# Patient Record
Sex: MALE | Race: WHITE | Employment: OTHER | URBAN - NONMETROPOLITAN AREA
[De-identification: names, ages, dates, MRNs, and addresses within clinical notes are randomized per-mention and may not be internally consistent; named-entity substitution may affect disease eponyms.]

---

## 2017-07-18 ENCOUNTER — APPOINTMENT (OUTPATIENT)
Dept: CT IMAGING | Age: 52
End: 2017-07-18
Payer: COMMERCIAL

## 2017-07-18 ENCOUNTER — HOSPITAL ENCOUNTER (EMERGENCY)
Age: 52
Discharge: HOME | End: 2017-07-18
Attending: SPECIALIST
Payer: COMMERCIAL

## 2017-07-18 VITALS
WEIGHT: 210 LBS | HEIGHT: 71 IN | SYSTOLIC BLOOD PRESSURE: 130 MMHG | RESPIRATION RATE: 14 BRPM | TEMPERATURE: 96.4 F | OXYGEN SATURATION: 98 % | BODY MASS INDEX: 29.4 KG/M2 | DIASTOLIC BLOOD PRESSURE: 70 MMHG | HEART RATE: 77 BPM

## 2017-07-18 DIAGNOSIS — R10.32 LLQ ABDOMINAL PAIN: ICD-10-CM

## 2017-07-18 DIAGNOSIS — N20.0 KIDNEY STONE: Primary | ICD-10-CM

## 2017-07-18 LAB
-: ABNORMAL
ABSOLUTE EOS #: 0.1 K/UL (ref 0–0.4)
ABSOLUTE LYMPH #: 1.4 K/UL (ref 1–4.8)
ABSOLUTE MONO #: 0.5 K/UL (ref 0.1–1.2)
ALBUMIN SERPL-MCNC: 4.3 G/DL (ref 3.5–5.2)
ALBUMIN/GLOBULIN RATIO: 1.3 (ref 1–2.5)
ALP BLD-CCNC: 110 U/L (ref 40–129)
ALT SERPL-CCNC: 61 U/L (ref 5–41)
AMORPHOUS: ABNORMAL
ANION GAP SERPL CALCULATED.3IONS-SCNC: 13 MMOL/L (ref 9–17)
AST SERPL-CCNC: 39 U/L
BACTERIA: ABNORMAL
BASOPHILS # BLD: 1 %
BASOPHILS ABSOLUTE: 0.1 K/UL (ref 0–0.2)
BILIRUB SERPL-MCNC: 0.25 MG/DL (ref 0.3–1.2)
BILIRUBIN URINE: NEGATIVE
BUN BLDV-MCNC: 22 MG/DL (ref 6–20)
BUN/CREAT BLD: 23 (ref 9–20)
CALCIUM SERPL-MCNC: 9.4 MG/DL (ref 8.6–10.4)
CASTS UA: ABNORMAL /LPF (ref 0–2)
CHLORIDE BLD-SCNC: 99 MMOL/L (ref 98–107)
CO2: 25 MMOL/L (ref 20–31)
COLOR: ABNORMAL
COMMENT UA: ABNORMAL
CREAT SERPL-MCNC: 0.94 MG/DL (ref 0.7–1.2)
CRYSTALS, UA: ABNORMAL /HPF
DIFFERENTIAL TYPE: ABNORMAL
EOSINOPHILS RELATIVE PERCENT: 1 %
EPITHELIAL CELLS UA: ABNORMAL /HPF (ref 0–5)
GFR AFRICAN AMERICAN: >60 ML/MIN
GFR NON-AFRICAN AMERICAN: >60 ML/MIN
GFR SERPL CREATININE-BSD FRML MDRD: ABNORMAL ML/MIN/{1.73_M2}
GFR SERPL CREATININE-BSD FRML MDRD: ABNORMAL ML/MIN/{1.73_M2}
GLUCOSE BLD-MCNC: 144 MG/DL (ref 70–99)
GLUCOSE URINE: NEGATIVE
HCT VFR BLD CALC: 41.8 % (ref 41–53)
HEMOGLOBIN: 13.8 G/DL (ref 13.5–17.5)
KETONES, URINE: NEGATIVE
LEUKOCYTE ESTERASE, URINE: NEGATIVE
LIPASE: 34 U/L (ref 13–60)
LYMPHOCYTES # BLD: 14 %
MCH RBC QN AUTO: 29.1 PG (ref 26–34)
MCHC RBC AUTO-ENTMCNC: 32.9 G/DL (ref 31–37)
MCV RBC AUTO: 88.3 FL (ref 80–100)
MONOCYTES # BLD: 5 %
MUCUS: ABNORMAL
NITRITE, URINE: NEGATIVE
OTHER OBSERVATIONS UA: ABNORMAL
PDW BLD-RTO: 13.5 % (ref 11–14.5)
PH UA: 5.5 (ref 5–6)
PLATELET # BLD: 226 K/UL (ref 140–450)
PLATELET ESTIMATE: ABNORMAL
PMV BLD AUTO: 9.2 FL (ref 6–12)
POTASSIUM SERPL-SCNC: 4.7 MMOL/L (ref 3.7–5.3)
PROTEIN UA: NEGATIVE
RBC # BLD: 4.74 M/UL (ref 4.5–5.9)
RBC # BLD: ABNORMAL 10*6/UL
RBC UA: ABNORMAL /HPF (ref 0–4)
RENAL EPITHELIAL, UA: ABNORMAL /HPF
SEG NEUTROPHILS: 79 %
SEGMENTED NEUTROPHILS ABSOLUTE COUNT: 8 K/UL (ref 1.8–7.7)
SODIUM BLD-SCNC: 137 MMOL/L (ref 135–144)
SPECIFIC GRAVITY UA: 1.02 (ref 1.01–1.02)
TOTAL PROTEIN: 7.7 G/DL (ref 6.4–8.3)
TRICHOMONAS: ABNORMAL
TURBIDITY: ABNORMAL
URINE HGB: ABNORMAL
UROBILINOGEN, URINE: NORMAL
WBC # BLD: 9.9 K/UL (ref 3.5–11)
WBC # BLD: ABNORMAL 10*3/UL
WBC UA: ABNORMAL /HPF (ref 0–4)
YEAST: ABNORMAL

## 2017-07-18 PROCEDURE — 96374 THER/PROPH/DIAG INJ IV PUSH: CPT

## 2017-07-18 PROCEDURE — 80053 COMPREHEN METABOLIC PANEL: CPT

## 2017-07-18 PROCEDURE — 83690 ASSAY OF LIPASE: CPT

## 2017-07-18 PROCEDURE — 2580000003 HC RX 258: Performed by: SPECIALIST

## 2017-07-18 PROCEDURE — 85025 COMPLETE CBC W/AUTO DIFF WBC: CPT

## 2017-07-18 PROCEDURE — 99284 EMERGENCY DEPT VISIT MOD MDM: CPT

## 2017-07-18 PROCEDURE — 6360000002 HC RX W HCPCS: Performed by: SPECIALIST

## 2017-07-18 PROCEDURE — 81001 URINALYSIS AUTO W/SCOPE: CPT

## 2017-07-18 PROCEDURE — 74176 CT ABD & PELVIS W/O CONTRAST: CPT | Performed by: RADIOLOGY

## 2017-07-18 PROCEDURE — 36415 COLL VENOUS BLD VENIPUNCTURE: CPT

## 2017-07-18 PROCEDURE — 96375 TX/PRO/DX INJ NEW DRUG ADDON: CPT

## 2017-07-18 PROCEDURE — 74176 CT ABD & PELVIS W/O CONTRAST: CPT

## 2017-07-18 RX ORDER — KETOROLAC TROMETHAMINE 30 MG/ML
30 INJECTION, SOLUTION INTRAMUSCULAR; INTRAVENOUS ONCE
Status: COMPLETED | OUTPATIENT
Start: 2017-07-18 | End: 2017-07-18

## 2017-07-18 RX ORDER — ONDANSETRON 2 MG/ML
4 INJECTION INTRAMUSCULAR; INTRAVENOUS ONCE
Status: COMPLETED | OUTPATIENT
Start: 2017-07-18 | End: 2017-07-18

## 2017-07-18 RX ORDER — LOSARTAN POTASSIUM 100 MG/1
100 TABLET ORAL DAILY
COMMUNITY
End: 2022-05-10 | Stop reason: SDUPTHER

## 2017-07-18 RX ORDER — IBUPROFEN 800 MG/1
800 TABLET ORAL EVERY 8 HOURS PRN
Qty: 20 TABLET | Refills: 0 | Status: SHIPPED | OUTPATIENT
Start: 2017-07-18 | End: 2022-09-21

## 2017-07-18 RX ORDER — 0.9 % SODIUM CHLORIDE 0.9 %
1000 INTRAVENOUS SOLUTION INTRAVENOUS ONCE
Status: COMPLETED | OUTPATIENT
Start: 2017-07-18 | End: 2017-07-18

## 2017-07-18 RX ORDER — SODIUM CHLORIDE 9 MG/ML
INJECTION, SOLUTION INTRAVENOUS CONTINUOUS
Status: DISCONTINUED | OUTPATIENT
Start: 2017-07-18 | End: 2017-07-18

## 2017-07-18 RX ORDER — HYDROCODONE BITARTRATE AND ACETAMINOPHEN 5; 325 MG/1; MG/1
1 TABLET ORAL EVERY 6 HOURS PRN
Qty: 10 TABLET | Refills: 0 | Status: SHIPPED | OUTPATIENT
Start: 2017-07-18 | End: 2017-07-25

## 2017-07-18 RX ORDER — OMEPRAZOLE 20 MG/1
20 CAPSULE, DELAYED RELEASE ORAL DAILY
COMMUNITY

## 2017-07-18 RX ADMIN — SODIUM CHLORIDE 1000 ML: 9 INJECTION, SOLUTION INTRAVENOUS at 10:48

## 2017-07-18 RX ADMIN — ONDANSETRON 4 MG: 2 INJECTION INTRAMUSCULAR; INTRAVENOUS at 10:48

## 2017-07-18 RX ADMIN — KETOROLAC TROMETHAMINE 30 MG: 30 INJECTION, SOLUTION INTRAMUSCULAR at 10:51

## 2017-07-18 ASSESSMENT — PAIN - FUNCTIONAL ASSESSMENT: PAIN_FUNCTIONAL_ASSESSMENT: 0-10

## 2017-07-18 ASSESSMENT — ENCOUNTER SYMPTOMS
ABDOMINAL PAIN: 1
NAUSEA: 1

## 2017-07-18 ASSESSMENT — PAIN DESCRIPTION - PAIN TYPE: TYPE: ACUTE PAIN

## 2017-07-18 ASSESSMENT — PAIN SCALES - GENERAL
PAINLEVEL_OUTOF10: 0
PAINLEVEL_OUTOF10: 0
PAINLEVEL_OUTOF10: 10

## 2017-07-18 ASSESSMENT — PAIN DESCRIPTION - DIRECTION: RADIATING_TOWARDS: LEFT LOWER BACK

## 2017-07-18 ASSESSMENT — PAIN SCALES - WONG BAKER
WONGBAKER_NUMERICALRESPONSE: 0
WONGBAKER_NUMERICALRESPONSE: 0

## 2017-07-18 ASSESSMENT — PAIN DESCRIPTION - DESCRIPTORS: DESCRIPTORS: ACHING;SHARP

## 2017-07-18 ASSESSMENT — PAIN DESCRIPTION - PROGRESSION: CLINICAL_PROGRESSION: GRADUALLY WORSENING

## 2017-07-18 ASSESSMENT — PAIN DESCRIPTION - FREQUENCY: FREQUENCY: CONTINUOUS

## 2017-07-18 ASSESSMENT — PAIN DESCRIPTION - LOCATION: LOCATION: ABDOMEN

## 2017-07-18 ASSESSMENT — PAIN DESCRIPTION - ORIENTATION: ORIENTATION: LEFT;LOWER

## 2017-07-18 ASSESSMENT — PAIN DESCRIPTION - ONSET: ONSET: ON-GOING

## 2019-01-01 LAB
ALBUMIN SERPL-MCNC: NORMAL G/DL
ALP BLD-CCNC: NORMAL U/L
ALT SERPL-CCNC: NORMAL U/L
ANION GAP SERPL CALCULATED.3IONS-SCNC: NORMAL MMOL/L
AST SERPL-CCNC: NORMAL U/L
BILIRUB SERPL-MCNC: NORMAL MG/DL (ref 0.1–1.4)
BUN BLDV-MCNC: NORMAL MG/DL
CALCIUM SERPL-MCNC: NORMAL MG/DL
CHLORIDE BLD-SCNC: NORMAL MMOL/L
CO2: NORMAL MMOL/L
CREAT SERPL-MCNC: NORMAL MG/DL
GFR CALCULATED: NORMAL
GLUCOSE BLD-MCNC: NORMAL MG/DL
POTASSIUM SERPL-SCNC: NORMAL MMOL/L
SODIUM BLD-SCNC: NORMAL MMOL/L
TOTAL PROTEIN: NORMAL

## 2019-02-26 LAB
BILIRUBIN, POC: NEGATIVE
BLOOD URINE, POC: NEGATIVE
CLARITY, POC: NORMAL
COLOR, POC: NORMAL
GLUCOSE URINE, POC: NEGATIVE
KETONES, POC: NEGATIVE
LEUKOCYTE EST, POC: NORMAL
NITRITE, POC: NEGATIVE
PH, POC: 7
PROTEIN, POC: NEGATIVE
SPECIFIC GRAVITY, POC: 1.02
UROBILINOGEN, POC: 0.2

## 2019-02-27 LAB
A/G RATIO: NORMAL
ALBUMIN SERPL-MCNC: 4.9 G/DL
ALP BLD-CCNC: 111 U/L
ALT SERPL-CCNC: 65 U/L
AST SERPL-CCNC: 38 U/L
BASOPHILS ABSOLUTE: 0.1 /ΜL
BASOPHILS RELATIVE PERCENT: 0.8 %
BILIRUB SERPL-MCNC: 0.3 MG/DL (ref 0.1–1.4)
BILIRUBIN DIRECT: NORMAL MG/DL
BILIRUBIN, INDIRECT: NORMAL
BUN BLDV-MCNC: 4.8 MG/DL
CALCIUM SERPL-MCNC: 10.1 MG/DL
CHLORIDE BLD-SCNC: 101 MMOL/L
CHOLESTEROL, TOTAL: 211 MG/DL
CHOLESTEROL/HDL RATIO: 5.6
CO2: 25 MMOL/L
CREAT SERPL-MCNC: 0.9 MG/DL
EOSINOPHILS ABSOLUTE: 0.1 /ΜL
EOSINOPHILS RELATIVE PERCENT: 1.7 %
GFR CALCULATED: NORMAL
GLOBULIN: NORMAL
GLUCOSE BLD-MCNC: 112 MG/DL
HCT VFR BLD CALC: 41 % (ref 41–53)
HDLC SERPL-MCNC: 38 MG/DL (ref 35–70)
HEMOGLOBIN: 14.9 G/DL (ref 13.5–17.5)
LDL CHOLESTEROL CALCULATED: 136 MG/DL (ref 0–160)
LYMPHOCYTES ABSOLUTE: 1.6 /ΜL
LYMPHOCYTES RELATIVE PERCENT: 27 %
MCH RBC QN AUTO: 29.7 PG
MCHC RBC AUTO-ENTMCNC: 36.3 G/DL
MCV RBC AUTO: 81.7 FL
MONOCYTES ABSOLUTE: 0.5 /ΜL
MONOCYTES RELATIVE PERCENT: 9.1 %
NEUTROPHILS ABSOLUTE: 3.6 /ΜL
NEUTROPHILS RELATIVE PERCENT: 61.2 %
PDW BLD-RTO: 13.2 %
PLATELET # BLD: 246 K/ΜL
PMV BLD AUTO: NORMAL FL
POTASSIUM SERPL-SCNC: 4 MMOL/L
PROSTATE SPECIFIC ANTIGEN: 0.25 NG/ML
PROTEIN TOTAL: 7.9 G/DL
RBC # BLD: 5.02 10^6/ΜL
SODIUM BLD-SCNC: 140 MMOL/L
TRIGL SERPL-MCNC: 186 MG/DL
VLDLC SERPL CALC-MCNC: 37 MG/DL
WBC # BLD: 5.9 10^3/ML

## 2019-03-01 ENCOUNTER — HOSPITAL ENCOUNTER (OUTPATIENT)
Age: 54
Discharge: HOME | End: 2019-03-01
Payer: COMMERCIAL

## 2019-03-01 ENCOUNTER — HOSPITAL ENCOUNTER (OUTPATIENT)
Dept: CT IMAGING | Age: 54
Discharge: HOME | End: 2019-03-01
Payer: COMMERCIAL

## 2019-03-01 DIAGNOSIS — R31.9 HEMATURIA, UNSPECIFIED TYPE: ICD-10-CM

## 2019-03-01 PROCEDURE — 6360000004 HC RX CONTRAST MEDICATION: Performed by: FAMILY MEDICINE

## 2019-03-01 PROCEDURE — 74178 CT ABD&PLV WO CNTR FLWD CNTR: CPT

## 2019-03-01 RX ADMIN — IOHEXOL 50 ML: 240 INJECTION, SOLUTION INTRATHECAL; INTRAVASCULAR; INTRAVENOUS; ORAL at 09:11

## 2019-03-01 RX ADMIN — IOPAMIDOL 100 ML: 755 INJECTION, SOLUTION INTRAVENOUS at 09:11

## 2019-03-05 ENCOUNTER — TELEPHONE (OUTPATIENT)
Dept: UROLOGY | Age: 54
End: 2019-03-05

## 2019-03-21 ENCOUNTER — OFFICE VISIT (OUTPATIENT)
Dept: UROLOGY | Age: 54
End: 2019-03-21
Payer: COMMERCIAL

## 2019-03-21 VITALS
BODY MASS INDEX: 30.78 KG/M2 | HEIGHT: 71 IN | WEIGHT: 219.9 LBS | DIASTOLIC BLOOD PRESSURE: 86 MMHG | SYSTOLIC BLOOD PRESSURE: 132 MMHG

## 2019-03-21 DIAGNOSIS — N20.0 KIDNEY STONE: Primary | ICD-10-CM

## 2019-03-21 DIAGNOSIS — R35.0 URINARY FREQUENCY: ICD-10-CM

## 2019-03-21 LAB
BILIRUBIN URINE: NEGATIVE
BLOOD URINE, POC: NEGATIVE
CHARACTER, URINE: CLEAR
COLOR, URINE: YELLOW
GLUCOSE URINE: NEGATIVE MG/DL
KETONES, URINE: NEGATIVE
LEUKOCYTE CLUMPS, URINE: NEGATIVE
NITRITE, URINE: NEGATIVE
PH, URINE: 6.5 (ref 5–9)
POST VOID RESIDUAL (PVR): 6 ML
PROTEIN, URINE: NEGATIVE MG/DL
SPECIFIC GRAVITY, URINE: 1.02 (ref 1–1.03)
UROBILINOGEN, URINE: 0.2 EU/DL (ref 0–1)

## 2019-03-21 PROCEDURE — 81003 URINALYSIS AUTO W/O SCOPE: CPT | Performed by: UROLOGY

## 2019-03-21 PROCEDURE — 99244 OFF/OP CNSLTJ NEW/EST MOD 40: CPT | Performed by: UROLOGY

## 2019-03-21 PROCEDURE — 51798 US URINE CAPACITY MEASURE: CPT | Performed by: UROLOGY

## 2019-03-21 RX ORDER — SENNOSIDES 8.6 MG
1300 CAPSULE ORAL 2 TIMES DAILY
COMMUNITY

## 2019-03-21 ASSESSMENT — ENCOUNTER SYMPTOMS
COLOR CHANGE: 0
EYE PAIN: 0
ABDOMINAL PAIN: 0
NAUSEA: 0
EYE REDNESS: 0
FACIAL SWELLING: 0
SHORTNESS OF BREATH: 0
CHEST TIGHTNESS: 0
BACK PAIN: 0

## 2019-03-21 NOTE — PROGRESS NOTES
Subjective:      Patient ID: Servando Steward 47 y.o. male 1965    Chief Complaint   Patient presents with    Advice Only     New Patient/kidney stone/CT done 3/1/19/referred by Dr Renetta Hampton   This is a new (kidney stones) problem. The current episode started 1 to 4 weeks ago. The problem occurs constantly. The problem has been unchanged. Pertinent negatives include no abdominal pain, chest pain, chills, fever, headaches, joint swelling, nausea or rash. Nothing aggravates the symptoms. He has tried nothing for the symptoms. The treatment provided no relief.        Past Medical History:   Diagnosis Date    Hypertension        Social History     Socioeconomic History    Marital status:      Spouse name: Not on file    Number of children: Not on file    Years of education: Not on file    Highest education level: Not on file   Occupational History    Not on file   Social Needs    Financial resource strain: Not on file    Food insecurity:     Worry: Not on file     Inability: Not on file    Transportation needs:     Medical: Not on file     Non-medical: Not on file   Tobacco Use    Smoking status: Never Smoker    Smokeless tobacco: Never Used   Substance and Sexual Activity    Alcohol use: No     Comment: socially    Drug use: No    Sexual activity: Not on file   Lifestyle    Physical activity:     Days per week: Not on file     Minutes per session: Not on file    Stress: Not on file   Relationships    Social connections:     Talks on phone: Not on file     Gets together: Not on file     Attends [employer] service: Not on file     Active member of club or organization: Not on file     Attends meetings of clubs or organizations: Not on file     Relationship status: Not on file    Intimate partner violence:     Fear of current or ex partner: Not on file     Emotionally abused: Not on file     Physically abused: Not on file     Forced sexual activity: Not on file   Other Topics Constitutional: He is oriented to person, place, and time. Vital signs are normal. He appears well-developed and well-nourished. He is cooperative. No distress. HENT:   Head: Normocephalic and atraumatic. Mouth/Throat: Oropharynx is clear and moist and mucous membranes are normal. No oropharyngeal exudate. Eyes: Pupils are equal, round, and reactive to light. EOM are normal. Right eye exhibits no discharge. Left eye exhibits no discharge. No scleral icterus. Neck: Trachea normal. No JVD present. No tracheal deviation present. Pulmonary/Chest: Effort normal. No respiratory distress. He has no wheezes. Abdominal: Soft. He exhibits no distension. There is no tenderness. There is no rebound and no CVA tenderness. Musculoskeletal: He exhibits no edema or tenderness. Lymphadenopathy:        Right: No supraclavicular adenopathy present. Left: No supraclavicular adenopathy present. Neurological: He is alert and oriented to person, place, and time. No cranial nerve deficit. Skin: Skin is warm and dry. He is not diaphoretic. Psychiatric: He has a normal mood and affect. His behavior is normal.   Nursing note and vitals reviewed.     Labs    Results for POC orders placed in visit on 03/21/19   POCT Urinalysis No Micro (Auto)   Result Value Ref Range    Glucose, Ur Negative NEGATIVE mg/dl    Bilirubin Urine Negative     Ketones, Urine Negative NEGATIVE    Specific Gravity, Urine 1.020 1.002 - 1.03    Blood, UA POC Negative NEGATIVE    pH, Urine 6.50 5.0 - 9.0    Protein, Urine Negative NEGATIVE mg/dl    Urobilinogen, Urine 0.20 0.0 - 1.0 eu/dl    Nitrite, Urine Negative NEGATIVE    Leukocyte Clumps, Urine Negative NEGATIVE    Color, Urine Yellow YELLOW-STR    Character, Urine Clear [id number]   poct post void residual   Result Value Ref Range    post void residual 6 ml       Lab Results   Component Value Date    CREATININE 0.9 02/27/2019    BUN 4.8 02/27/2019     02/27/2019    K 4 02/27/2019  02/27/2019    CO2 25 02/27/2019       Lab Results   Component Value Date    PSA 0.247 02/27/2019        Radiology  The patient has had a CT Abdomen Pelvis with and without contrast that I have reviewed along with its accompanying report. The study demonstrates:    Narrative   PROCEDURE: CT ABDOMEN PELVIS W WO CONTRAST       CLINICAL INFORMATION: Hematuria, unspecified type .       COMPARISON: No prior study.       TECHNIQUE: 2-D multiplanar post contrast images of the abdomen and pelvis. Isovue-370 IV and oral contrast. Images of the abdomen and pelvis prior to IV contrast administration. All CT scans at this facility use dose modulation, iterative reconstruction, and/or weight-based dosing when appropriate to reduce radiation dose to as low as reasonably achievable.       FINDINGS:    Noncontrast   Punctate calcification lower pole left kidney. No other calcifications are identified no hydronephrosis is seen. Small gallstone is present. No pancreatic calcifications. No bladder calcifications. Phleboliths are present in the pelvis.       Postcontrast:   Diffuse hepatic steatosis. Spleen is normal.   Pancreas is normal.   Adrenals are normal.   Kidneys enhance symmetrically. No hydronephrosis. Aorta is unremarkable.       Pelvis   There is a normal appendix. There is no bowel obstruction. There are a few scattered diverticular lesions no evidence of diverticulitis. Prostate is unremarkable. Bladder is unremarkable. No suspicious bone lesions           Impression   1. Nonobstructive left nephrolithiasis   2. Cholelithiasis   3. Hepatic steatosis   4 minimal uncomplicated diverticulosis             Assessment:       Diagnosis Orders   1. Kidney stone  POCT Urinalysis No Micro (Auto)   2. Urinary frequency  poct post void residual       Mr. Rodger Reyes presents today in consultation for Kidney stone [N20.0]. Reagan Hanson comes in today with a history of kidney stones.   He previously had a kidney stone

## 2019-04-23 ENCOUNTER — TELEPHONE (OUTPATIENT)
Dept: UROLOGY | Age: 54
End: 2019-04-23

## 2019-04-23 RX ORDER — POTASSIUM CITRATE 10 MEQ/1
10 TABLET, EXTENDED RELEASE ORAL
Qty: 90 TABLET | Refills: 3 | Status: SHIPPED | OUTPATIENT
Start: 2019-04-23 | End: 2022-06-21 | Stop reason: ALTCHOICE

## 2019-04-23 NOTE — TELEPHONE ENCOUNTER
The patient was advised of the results and recommendations of the litholink per Homer Durán CNP. He voiced understanding to increase his water intake and to start the potassium citrate.

## 2019-04-23 NOTE — TELEPHONE ENCOUNTER
Please let patient now that we received results of his 24-hour urine analysis. , He needs to increase his fluid intake, currently at 2.11 L per day, in patients who are stone formers would like this to be around 2.5-3 L daily. His urine citrate level was markedly abnormal, they're recommending treatment with potassiums citrate, prescription sent to the pharmacy will start at 10 mEq 3 times daily.

## 2022-05-10 PROBLEM — R31.9 HEMATURIA: Status: ACTIVE | Noted: 2022-05-10

## 2022-05-10 PROBLEM — Z87.442 HISTORY OF KIDNEY STONES: Status: ACTIVE | Noted: 2022-05-10

## 2022-05-10 PROBLEM — I10 PRIMARY HYPERTENSION: Status: ACTIVE | Noted: 2022-05-10

## 2022-05-18 ENCOUNTER — HOSPITAL ENCOUNTER (OUTPATIENT)
Dept: ULTRASOUND IMAGING | Age: 57
Discharge: HOME | End: 2022-05-18
Payer: COMMERCIAL

## 2022-05-18 DIAGNOSIS — Z87.442 HISTORY OF KIDNEY STONES: ICD-10-CM

## 2022-05-18 DIAGNOSIS — R93.89 ABNORMAL MRI: ICD-10-CM

## 2022-05-18 DIAGNOSIS — N26.1 LEFT RENAL ATROPHY: ICD-10-CM

## 2022-05-18 DIAGNOSIS — R31.9 HEMATURIA, UNSPECIFIED TYPE: ICD-10-CM

## 2022-05-18 PROBLEM — R73.03 PREDIABETES: Status: ACTIVE | Noted: 2022-05-18

## 2022-05-18 PROBLEM — E78.1 HYPERTRIGLYCERIDEMIA: Status: ACTIVE | Noted: 2022-05-18

## 2022-05-18 PROCEDURE — 76770 US EXAM ABDO BACK WALL COMP: CPT

## 2022-05-25 ENCOUNTER — TELEPHONE (OUTPATIENT)
Dept: NEPHROLOGY | Age: 57
End: 2022-05-25

## 2022-05-25 NOTE — TELEPHONE ENCOUNTER
Left message for patient to call us so we can get him scheduled with Dr. Bhavana Rachel. July 6th at 12:20 in [facility] or [facility] June 21 at 8 am or June 27 th at 9 or 10.

## 2022-06-21 ENCOUNTER — OFFICE VISIT (OUTPATIENT)
Dept: NEPHROLOGY | Age: 57
End: 2022-06-21
Payer: COMMERCIAL

## 2022-06-21 VITALS
HEIGHT: 70 IN | WEIGHT: 223 LBS | DIASTOLIC BLOOD PRESSURE: 88 MMHG | BODY MASS INDEX: 31.92 KG/M2 | SYSTOLIC BLOOD PRESSURE: 140 MMHG | HEART RATE: 74 BPM | OXYGEN SATURATION: 99 %

## 2022-06-21 DIAGNOSIS — I10 PRIMARY HYPERTENSION: ICD-10-CM

## 2022-06-21 DIAGNOSIS — N20.0 LEFT NEPHROLITHIASIS: Primary | ICD-10-CM

## 2022-06-21 PROCEDURE — 99204 OFFICE O/P NEW MOD 45 MIN: CPT | Performed by: INTERNAL MEDICINE

## 2022-06-21 RX ORDER — POTASSIUM CITRATE 10 MEQ/1
10 TABLET, EXTENDED RELEASE ORAL
Qty: 270 TABLET | Refills: 3 | Status: SHIPPED | OUTPATIENT
Start: 2022-06-21

## 2022-06-21 NOTE — PROGRESS NOTES
Nephrology Consult Note  Patient's Name: Nick Patricia  8:09 AM  6/21/2022    Nephrologist: Anthony Olson    Reason for Consult: Atrophic left kidney  Requesting Physician:  No att. providers found  PCP: MIKAELA Aiken CNP    Chief Complaint:  None  Assessment   Diagnosis Orders   1. Left nephrolithiasis  [facility]    Basic Metabolic Panel   2. Primary hypertension           Plan    1. I discussed my thought processes with the patient and spouse. 2. They understood. 3. I addressed their questions. 4. The left kidney may be smaller than the right kidney but is still within the normal size with no atrophy. 5. However, he does have a history of kidney stones and would like that addressed as well. 6. As a result, we will do a Litholink studies this visit  7. We will also start him on a potassium citrate 1080 mg 1 tablet 3 times a day. 8. This was decided after reviewing his previous Litholink study in April 2019 done by Dr. Donte Ahmadi from urology which showed low urine citrate level. 9. I discussed the report with him as well. 10. We will see him back in 3 months with repeat Litholink study along with a BMP. 11. I encouraged him to drink Plenty of fluids at least 2500 mL a day which is according to recommendation by the literature. History Obtained From:    History of Present Ilness:    Nick Patricia is a 65 y.o. male with history of hypertension who was referred to our office because of left atrophic Kidney according to the notes. Patient had a recent Kidney ultrasound done. It revealed a right kidney of 13.3 cm with left of 10.4 cm. This was therefore described as atrophic Left kidney and patient was asked to see us. He has also left renal calculus nonobstructive which is not new. Past Medical History:   Diagnosis Date    Hypertension        Past Surgical History:   Procedure Laterality Date    TONSILLECTOMY      during childhood       No family history on file. reports that he is a non-smoker but has been exposed to tobacco smoke. He has never used smokeless tobacco. He reports that he does not drink alcohol and does not use drugs. Allergies:  Patient has no known allergies. Current Medications:    No current facility-administered medications for this visit. Review of Systems:   Review of Systems   Pertinent positives stated above in HPI. All other systems were reviewed and were negative. ROS:As in HPI    Physical exam:   Physical Exam   Constitutional: Well-developed middle-aged gentleman in no apparent distress. Vitals:   Vitals:    06/21/22 0803   BP: (!) 140/88   Pulse: 74   SpO2: 99%       Skin: no rash, turgor is normal  Heent: Pupils are reactive to light. Throat is clear. Oral mucosa is moist.  Neck: no bruits or jvd noted   Cardiovascular:  S1, S2 without murmur   Respiratory: Clear with no wheezes,rhonchi or rales   Abdomen: soft,non tender,good bowel sound and no palpable mass  Ext: No lower extremity edema  Psychiatric: mood and affect appropriate  Musculoskeletal:  Rom, muscular strength intact   CNS: Very awake and very alert. Well-oriented. Normal speech. Good motor strength. No obvious focal deficit.     Data:   Labs:  Lab Results   Component Value Date     05/10/2022     02/27/2019     07/18/2017    K 4.4 05/10/2022    K 4 02/27/2019    K 4.7 07/18/2017     05/10/2022    CO2 26 05/10/2022    CO2 25 02/27/2019    CO2 25 07/18/2017    CREATININE 1.00 05/10/2022    CREATININE 0.9 02/27/2019    CREATININE 0.94 07/18/2017    BUN 19 05/10/2022    BUN 4.8 02/27/2019    BUN 22 (H) 07/18/2017    GLUCOSE 101 (H) 05/10/2022    GLUCOSE 112 02/27/2019    GLUCOSE 144 (H) 07/18/2017    WBC 8.4 05/10/2022    WBC 5.9 02/27/2019    WBC 9.9 07/18/2017    HGB 14.6 05/10/2022    HGB 14.9 02/27/2019    HGB 13.8 07/18/2017    HCT 44.4 05/10/2022    HCT 41.0 02/27/2019    HCT 41.8 07/18/2017    MCV 88 05/10/2022     05/10/2022 {Labs reviewed. Imaging:  CXR results: Previous diagnostic images reports reviewed        Thank you Dr. Chidi Gorman, APRN - CNP for allowing us to participate in care of Shelly Blinks   **This report has been created using voice recognition software. It maycontain minor  errors which are inherent in voice recognition technology. **    Electronically signed by Luis Fernando Tamez MD on 6/21/2022 at 8:09 AM

## 2022-09-12 ENCOUNTER — HOSPITAL ENCOUNTER (OUTPATIENT)
Age: 57
Discharge: HOME | End: 2022-09-12
Payer: COMMERCIAL

## 2022-09-12 DIAGNOSIS — N20.0 LEFT NEPHROLITHIASIS: ICD-10-CM

## 2022-09-12 LAB
ANION GAP SERPL CALCULATED.3IONS-SCNC: 12 MEQ/L (ref 8–16)
BUN BLDV-MCNC: 20 MG/DL (ref 7–22)
CALCIUM SERPL-MCNC: 9.7 MG/DL (ref 8.5–10.5)
CHLORIDE BLD-SCNC: 103 MEQ/L (ref 98–111)
CO2: 25 MEQ/L (ref 23–33)
CREAT SERPL-MCNC: 1 MG/DL (ref 0.4–1.2)
GFR SERPL CREATININE-BSD FRML MDRD: 77 ML/MIN/1.73M2
GLUCOSE BLD-MCNC: 99 MG/DL (ref 70–108)
POTASSIUM SERPL-SCNC: 4.7 MEQ/L (ref 3.5–5.2)
SODIUM BLD-SCNC: 140 MEQ/L (ref 135–145)

## 2022-09-12 PROCEDURE — 36415 COLL VENOUS BLD VENIPUNCTURE: CPT

## 2022-09-12 PROCEDURE — 80048 BASIC METABOLIC PNL TOTAL CA: CPT

## 2022-09-21 ENCOUNTER — OFFICE VISIT (OUTPATIENT)
Dept: NEPHROLOGY | Age: 57
End: 2022-09-21
Payer: COMMERCIAL

## 2022-09-21 VITALS
WEIGHT: 228 LBS | BODY MASS INDEX: 32.71 KG/M2 | OXYGEN SATURATION: 98 % | SYSTOLIC BLOOD PRESSURE: 135 MMHG | HEART RATE: 79 BPM | DIASTOLIC BLOOD PRESSURE: 92 MMHG

## 2022-09-21 DIAGNOSIS — N20.0 NEPHROLITHIASIS: Primary | ICD-10-CM

## 2022-09-21 DIAGNOSIS — I10 PRIMARY HYPERTENSION: ICD-10-CM

## 2022-09-21 PROCEDURE — 99214 OFFICE O/P EST MOD 30 MIN: CPT | Performed by: INTERNAL MEDICINE

## 2022-09-21 NOTE — PROGRESS NOTES
Renal Progress Note    Assessment and Plan:      Diagnosis Orders   1. Nephrolithiasis        2. Primary hypertension                  PLAN:  Lab result reviewed with the patient. He understood. We reviewed the report together in epic. Average  urine is at 1.5 L a day  He needs about  2500 mL of urine a day to prevent stone formation  I discussed that with him  Will continue o potassiums citrate same dose for now  Return visit in 3 months with a repeat Litholink studies          Patient Active Problem List   Diagnosis    Primary hypertension    Hematuria    History of kidney stones    Prediabetes    Hypertriglyceridemia           Subjective:   Chief complaint:  Chief Complaint   Patient presents with    Nephrolithiasis      HPI:This is a follow up visit for Mr. Vesta Dalal here today for return appointment. I saw him in the initial appointment about 3 months ago for nephrolithiasis. He had done well since then but he did pass several stones last weekend. According to him, they were smaller than usual.  It was on the left side. \"Started on potassium citrate 3 months ago. He is doing well with it. Otherwise no other complaint. No chest pain. No shortness of breath. No nausea or vomiting. No difficulties with urination. ROS:  Pertinent positives stated above in HPI. All other systems were reviewed and were negative.   Medications:     Current Outpatient Medications   Medication Sig Dispense Refill    NONFORMULARY Nervive      Fexofenadine HCl (ALLEGRA PO) Take by mouth daily      NONFORMULARY daily nutra-mend      potassium citrate (UROCIT-K) 10 MEQ (1080 MG) extended release tablet Take 1 tablet by mouth 3 times daily (with meals) 270 tablet 3    Ascorbic Acid (VITAMIN C PO) Take 1,000 mg by mouth       losartan (COZAAR) 100 MG tablet Take 1 tablet by mouth daily 90 tablet 3    acetaminophen (TYLENOL) 650 MG extended release tablet Take 1,300 mg by mouth in the morning and at bedtime omeprazole (PRILOSEC) 20 MG delayed release capsule Take 20 mg by mouth daily      Multiple Vitamins-Minerals (MULTIVITAMIN ADULT PO) Take 1 tablet by mouth daily       No current facility-administered medications for this visit.        Lab Results:    CBC:   Lab Results   Component Value Date    WBC 8.4 05/10/2022    HGB 14.6 05/10/2022    HCT 44.4 05/10/2022    MCV 88 05/10/2022     05/10/2022     BMP:    Lab Results   Component Value Date     09/12/2022     05/10/2022     02/27/2019    K 4.7 09/12/2022    K 4.4 05/10/2022    K 4 02/27/2019     09/12/2022     05/10/2022     02/27/2019    CO2 25 09/12/2022    CO2 26 05/10/2022    CO2 25 02/27/2019    BUN 20 09/12/2022    BUN 19 05/10/2022    BUN 4.8 02/27/2019    CREATININE 1.0 09/12/2022    CREATININE 1.00 05/10/2022    CREATININE 0.9 02/27/2019    GLUCOSE 99 09/12/2022    GLUCOSE 101 (H) 05/10/2022    GLUCOSE 112 02/27/2019      Hepatic:   Lab Results   Component Value Date    AST 32 05/10/2022    AST 38 02/27/2019    AST 39 07/18/2017    ALT 46 (H) 05/10/2022    ALT 65 02/27/2019    ALT 61 (H) 07/18/2017    BILITOT 0.4 05/10/2022    BILITOT 0.3 02/27/2019    BILITOT 0.25 (L) 07/18/2017    ALKPHOS 106 05/10/2022    ALKPHOS 111 02/27/2019    ALKPHOS 110 07/18/2017     BNP: No results found for: BNP  Lipids:   Lab Results   Component Value Date    CHOL 214 (H) 05/10/2022    HDL 36 (L) 05/10/2022     INR: No results found for: INR  URINE:   Lab Results   Component Value Date/Time    PROTUR Negative 03/21/2019 09:06 AM     Lab Results   Component Value Date/Time    NITRU Negative 03/21/2019 09:06 AM    COLORU NEG 05/10/2022 10:42 AM    COLORU NOT REPORTED 07/18/2017 11:38 AM    PHUR 5.5 05/10/2022 10:42 AM    PHUR 5.5 07/18/2017 11:38 AM    WBCUA 0 TO 4 07/18/2017 11:38 AM    RBCUA 25 TO 50 07/18/2017 11:38 AM    MUCUS 1+ 07/18/2017 11:38 AM    TRICHOMONAS NOT REPORTED 07/18/2017 11:38 AM    YEAST NOT REPORTED 07/18/2017 11:38 AM    BACTERIA TRACE 07/18/2017 11:38 AM    CLARITYU NEG 05/10/2022 10:42 AM    SPECGRAV 1.015 05/10/2022 10:42 AM    SPECGRAV 1.020 07/18/2017 11:38 AM    LEUKOCYTESUR NEG 05/10/2022 10:42 AM    LEUKOCYTESUR NEGATIVE 07/18/2017 11:38 AM    UROBILINOGEN 0.20 03/21/2019 09:06 AM    BILIRUBINUR NEG 05/10/2022 10:42 AM    BLOODU MODERATE 05/10/2022 10:42 AM    GLUCOSEU NEG 05/10/2022 10:42 AM    GLUCOSEU Negative 03/21/2019 09:06 AM    KETUA NEG 05/10/2022 10:42 AM    KETUA Negative 03/21/2019 09:06 AM    AMORPHOUS NOT REPORTED 07/18/2017 11:38 AM      Microalbumen/Creatinine ratio:  No components found for: RUCREAT    Objective:   Vitals: BP (!) 135/92 (Site: Left Upper Arm, Position: Sitting)   Pulse 79   Wt 228 lb (103.4 kg)   SpO2 98%   BMI 32.71 kg/m²      Constitutional:  Alert, awake, no apparent distress  Skin:normal with no rash or any significant lesions  HEENT:Pupils are reactive . Throat is clear. Oral mucosa is moist.  Neck:supple with no thyromegaly, JVD, lymphadenopathy or bruit   Cardiovascular: Regular sinus rhythm without murmur, rubs or gallops   Respiratory:  Clear to auscultation with no wheezes or rales  Abdomen: Good bowel sound, soft, non tender and no bruit  Ext: No LE edema  Musculoskeletal:Intact  Neuro:Alert, awake and oriented with no obvious focal deficit. Speech is normal.    Electronically signed by Kahlil Reyna MD on 9/21/2022 at 10:52 AM   **This report has been created using voice recognition software. It maycontain minor  errors which are inherent in voice recognition technology. **

## 2022-12-21 ENCOUNTER — OFFICE VISIT (OUTPATIENT)
Dept: NEPHROLOGY | Age: 57
End: 2022-12-21
Payer: COMMERCIAL

## 2022-12-21 VITALS
OXYGEN SATURATION: 98 % | DIASTOLIC BLOOD PRESSURE: 88 MMHG | WEIGHT: 234 LBS | BODY MASS INDEX: 33.58 KG/M2 | HEART RATE: 82 BPM | SYSTOLIC BLOOD PRESSURE: 171 MMHG

## 2022-12-21 DIAGNOSIS — I10 PRIMARY HYPERTENSION: Primary | ICD-10-CM

## 2022-12-21 DIAGNOSIS — N20.0 NEPHROLITHIASIS: ICD-10-CM

## 2022-12-21 PROCEDURE — 99214 OFFICE O/P EST MOD 30 MIN: CPT | Performed by: INTERNAL MEDICINE

## 2022-12-21 PROCEDURE — 3075F SYST BP GE 130 - 139MM HG: CPT | Performed by: INTERNAL MEDICINE

## 2022-12-21 PROCEDURE — 3078F DIAST BP <80 MM HG: CPT | Performed by: INTERNAL MEDICINE

## 2022-12-21 NOTE — PATIENT INSTRUCTIONS
Please change the potassium citrate from 1 tablet 3 times a day to 2 tablets twice a day for a total of 4 tablets a day.

## 2022-12-21 NOTE — PROGRESS NOTES
Renal Progress Note    Assessment and Plan:      Diagnosis Orders   1. Primary hypertension  Basic Metabolic Panel      2. Nephrolithiasis  Basic Metabolic Panel    LITHOLINK                PLAN:  Lab result reviewed with the patient. He understood  We went through the report together in epic  Emphasized increasing urine volume again to the patient at least 2500 ml a day  Increase  potassium citrate from 1 tablet 3 times a day to 2 tablets twice a day  Return visit in 6 months with labs          Patient Active Problem List   Diagnosis    Primary hypertension    Hematuria    History of kidney stones    Prediabetes    Hypertriglyceridemia    Nephrolithiasis           Subjective:   Chief complaint:  Chief Complaint   Patient presents with    Nephrolithiasis      HPI:This is a follow up visit for Mr. Rogerio Forrest who is here today for return appointment. I see him for nephrolithiasis. He also has high blood pressure. Elder Palacios He was last seen about 3 months ago. He did have a kidney stone attack yesterday first time since I saw him 3 months ago. There was no blood in the urine however. He had left-sided flank pain before he passed the stone. .  No difficulties with urination. Appetite is good. No issues of concern otherwise. Blood pressure is high in the office today but has been normal at home . He denies any chest pain or shortness of breath. ROS:  Pertinent positives stated above in HPI. All other systems were reviewed and were negative.   Medications:     Current Outpatient Medications   Medication Sig Dispense Refill    Fexofenadine HCl (ALLEGRA PO) Take by mouth daily      potassium citrate (UROCIT-K) 10 MEQ (1080 MG) extended release tablet Take 1 tablet by mouth 3 times daily (with meals) 270 tablet 3    losartan (COZAAR) 100 MG tablet Take 1 tablet by mouth daily 90 tablet 3    acetaminophen (TYLENOL) 650 MG extended release tablet Take 1,300 mg by mouth in the morning and at bedtime      omeprazole (PRILOSEC) 20 MG delayed release capsule Take 20 mg by mouth daily      Multiple Vitamins-Minerals (MULTIVITAMIN ADULT PO) Take 1 tablet by mouth daily       No current facility-administered medications for this visit.        Lab Results:    CBC:   Lab Results   Component Value Date    WBC 8.4 05/10/2022    HGB 14.6 05/10/2022    HCT 44.4 05/10/2022    MCV 88 05/10/2022     05/10/2022     BMP:    Lab Results   Component Value Date     09/12/2022     05/10/2022     02/27/2019    K 4.7 09/12/2022    K 4.4 05/10/2022    K 4 02/27/2019     09/12/2022     05/10/2022     02/27/2019    CO2 25 09/12/2022    CO2 26 05/10/2022    CO2 25 02/27/2019    BUN 20 09/12/2022    BUN 19 05/10/2022    BUN 4.8 02/27/2019    CREATININE 1.0 09/12/2022    CREATININE 1.00 05/10/2022    CREATININE 0.9 02/27/2019    GLUCOSE 99 09/12/2022    GLUCOSE 101 (H) 05/10/2022    GLUCOSE 112 02/27/2019      Hepatic:   Lab Results   Component Value Date    AST 32 05/10/2022    AST 38 02/27/2019    AST 39 07/18/2017    ALT 46 (H) 05/10/2022    ALT 65 02/27/2019    ALT 61 (H) 07/18/2017    BILITOT 0.4 05/10/2022    BILITOT 0.3 02/27/2019    BILITOT 0.25 (L) 07/18/2017    ALKPHOS 106 05/10/2022    ALKPHOS 111 02/27/2019    ALKPHOS 110 07/18/2017     BNP: No results found for: BNP  Lipids:   Lab Results   Component Value Date    CHOL 214 (H) 05/10/2022    HDL 36 (L) 05/10/2022     INR: No results found for: INR  URINE:   Lab Results   Component Value Date/Time    PROTUR Negative 03/21/2019 09:06 AM     Lab Results   Component Value Date/Time    NITRU Negative 03/21/2019 09:06 AM    COLORU NEG 05/10/2022 10:42 AM    COLORU NOT REPORTED 07/18/2017 11:38 AM    PHUR 5.5 05/10/2022 10:42 AM    PHUR 5.5 07/18/2017 11:38 AM    WBCUA 0 TO 4 07/18/2017 11:38 AM    RBCUA 25 TO 50 07/18/2017 11:38 AM    MUCUS 1+ 07/18/2017 11:38 AM    TRICHOMONAS NOT REPORTED 07/18/2017 11:38 AM    YEAST NOT REPORTED 07/18/2017 11:38 AM BACTERIA TRACE 07/18/2017 11:38 AM    CLARITYU NEG 05/10/2022 10:42 AM    SPECGRAV 1.015 05/10/2022 10:42 AM    SPECGRAV 1.020 07/18/2017 11:38 AM    LEUKOCYTESUR NEG 05/10/2022 10:42 AM    LEUKOCYTESUR NEGATIVE 07/18/2017 11:38 AM    UROBILINOGEN 0.20 03/21/2019 09:06 AM    BILIRUBINUR NEG 05/10/2022 10:42 AM    BLOODU MODERATE 05/10/2022 10:42 AM    GLUCOSEU NEG 05/10/2022 10:42 AM    GLUCOSEU Negative 03/21/2019 09:06 AM    KETUA NEG 05/10/2022 10:42 AM    KETUA Negative 03/21/2019 09:06 AM    AMORPHOUS NOT REPORTED 07/18/2017 11:38 AM      Microalbumen/Creatinine ratio:  No components found for: RUCREAT    Objective:   Vitals: BP (!) 171/88 (Site: Right Upper Arm, Position: Sitting, Cuff Size: Large Adult)   Pulse 82   Wt 234 lb (106.1 kg)   SpO2 98%   BMI 33.58 kg/m²      Constitutional:  Alert, awake, no apparent distress  Skin:normal with no rash or any significant lesions  HEENT:Pupils are reactive . Throat is clear. Oral mucosa is moist.  Neck:supple with no thyromegaly, JVD, lymphadenopathy or bruit   Cardiovascular: Regular sinus rhythm without murmur, rubs or gallops   Respiratory:  Clear to auscultation with no wheezes or rales  Abdomen: Good bowel sound, soft, non tender and no bruit  Ext: No LE edema  Musculoskeletal:Intact  Neuro:Alert, awake and oriented with no obvious focal deficit. Speech is normal.    Electronically signed by Rayray Dodd MD on 12/21/2022 at 11:18 AM   **This report has been created using voice recognition software. It maycontain minor  errors which are inherent in voice recognition technology. **

## 2022-12-27 DIAGNOSIS — N20.0 NEPHROLITHIASIS: ICD-10-CM

## 2023-06-13 ENCOUNTER — HOSPITAL ENCOUNTER (OUTPATIENT)
Age: 58
Discharge: HOME | End: 2023-06-13
Payer: COMMERCIAL

## 2023-06-13 DIAGNOSIS — N20.0 NEPHROLITHIASIS: ICD-10-CM

## 2023-06-13 DIAGNOSIS — I10 PRIMARY HYPERTENSION: ICD-10-CM

## 2023-06-13 LAB
ANION GAP SERPL CALC-SCNC: 12 MEQ/L (ref 8–16)
BUN SERPL-MCNC: 16 MG/DL (ref 7–22)
CALCIUM SERPL-MCNC: 9.1 MG/DL (ref 8.5–10.5)
CHLORIDE SERPL-SCNC: 100 MEQ/L (ref 98–111)
CO2 SERPL-SCNC: 26 MEQ/L (ref 23–33)
CREAT SERPL-MCNC: 1.1 MG/DL (ref 0.4–1.2)
GFR SERPL CREATININE-BSD FRML MDRD: > 60 ML/MIN/1.73M2
GLUCOSE SERPL-MCNC: 105 MG/DL (ref 70–108)
POTASSIUM SERPL-SCNC: 4.7 MEQ/L (ref 3.5–5.2)
SODIUM SERPL-SCNC: 138 MEQ/L (ref 135–145)

## 2023-06-13 PROCEDURE — 80048 BASIC METABOLIC PNL TOTAL CA: CPT

## 2023-06-13 PROCEDURE — 36415 COLL VENOUS BLD VENIPUNCTURE: CPT

## 2023-06-21 ENCOUNTER — OFFICE VISIT (OUTPATIENT)
Dept: NEPHROLOGY | Age: 58
End: 2023-06-21
Payer: COMMERCIAL

## 2023-06-21 VITALS
HEART RATE: 78 BPM | SYSTOLIC BLOOD PRESSURE: 144 MMHG | WEIGHT: 232 LBS | DIASTOLIC BLOOD PRESSURE: 88 MMHG | BODY MASS INDEX: 33.29 KG/M2 | OXYGEN SATURATION: 98 %

## 2023-06-21 DIAGNOSIS — N20.0 NEPHROLITHIASIS: Primary | ICD-10-CM

## 2023-06-21 PROCEDURE — 3077F SYST BP >= 140 MM HG: CPT | Performed by: INTERNAL MEDICINE

## 2023-06-21 PROCEDURE — 3079F DIAST BP 80-89 MM HG: CPT | Performed by: INTERNAL MEDICINE

## 2023-06-21 PROCEDURE — 99214 OFFICE O/P EST MOD 30 MIN: CPT | Performed by: INTERNAL MEDICINE

## 2023-06-21 RX ORDER — POTASSIUM CITRATE 10 MEQ/1
10 TABLET, EXTENDED RELEASE ORAL 4 TIMES DAILY
Qty: 360 TABLET | Refills: 3 | Status: SHIPPED | OUTPATIENT
Start: 2023-06-21

## 2023-06-21 NOTE — PROGRESS NOTES
07/18/2017 11:38 AM    LEUKOCYTESUR NEG 05/10/2022 10:42 AM    LEUKOCYTESUR NEGATIVE 07/18/2017 11:38 AM    UROBILINOGEN 0.20 03/21/2019 09:06 AM    BILIRUBINUR NEG 05/10/2022 10:42 AM    BLOODU MODERATE 05/10/2022 10:42 AM    GLUCOSEU NEG 05/10/2022 10:42 AM    GLUCOSEU Negative 03/21/2019 09:06 AM    KETUA NEG 05/10/2022 10:42 AM    KETUA Negative 03/21/2019 09:06 AM    AMORPHOUS NOT REPORTED 07/18/2017 11:38 AM      Microalbumen/Creatinine ratio:  No components found for: RUCREAT    Objective:   Vitals: BP (!) 144/88 (Site: Right Upper Arm, Position: Sitting, Cuff Size: Large Adult)   Pulse 78   Wt 232 lb (105.2 kg)   SpO2 98%   BMI 33.29 kg/m²      Constitutional:  Alert, awake, no apparent distress  Skin:normal with no rash or any significant lesions  HEENT:Pupils are reactive . Throat is clear. Oral mucosa is moist.  Neck:supple with no thyromegaly, JVD, lymphadenopathy or bruit **  Cardiovascular: Regular sinus rhythm without murmur, rubs or gallops   Respiratory:  Clear to auscultation with no wheezes or rales  Abdomen: Good bowel sound, soft, non tender and no bruit  Ext: No LE edema  Musculoskeletal:Intact  Neuro:Alert, awake and oriented with no obvious focal deficit. Speech is normal.**    Electronically signed by Gee Saldivar MD on 6/21/2023 at 11:54 AM   **This report has been created using voice recognition software. It maycontain minor  errors which are inherent in voice recognition technology. **

## 2023-08-23 ENCOUNTER — OFFICE VISIT (OUTPATIENT)
Dept: PHYSICAL MEDICINE AND REHAB | Age: 58
End: 2023-08-23
Payer: COMMERCIAL

## 2023-08-23 ENCOUNTER — TELEPHONE (OUTPATIENT)
Dept: PHYSICAL MEDICINE AND REHAB | Age: 58
End: 2023-08-23

## 2023-08-23 VITALS
DIASTOLIC BLOOD PRESSURE: 88 MMHG | SYSTOLIC BLOOD PRESSURE: 146 MMHG | BODY MASS INDEX: 32.35 KG/M2 | WEIGHT: 226 LBS | HEIGHT: 70 IN

## 2023-08-23 DIAGNOSIS — M47.816 LUMBAR SPONDYLOSIS: Primary | ICD-10-CM

## 2023-08-23 DIAGNOSIS — M54.16 LUMBAR RADICULITIS: ICD-10-CM

## 2023-08-23 DIAGNOSIS — G62.9 NEUROPATHY: ICD-10-CM

## 2023-08-23 PROCEDURE — 3079F DIAST BP 80-89 MM HG: CPT | Performed by: NURSE PRACTITIONER

## 2023-08-23 PROCEDURE — 3077F SYST BP >= 140 MM HG: CPT | Performed by: NURSE PRACTITIONER

## 2023-08-23 PROCEDURE — 99244 OFF/OP CNSLTJ NEW/EST MOD 40: CPT | Performed by: NURSE PRACTITIONER

## 2023-08-23 RX ORDER — PREGABALIN 50 MG/1
50 CAPSULE ORAL 2 TIMES DAILY
Qty: 60 CAPSULE | Refills: 1 | Status: SHIPPED | OUTPATIENT
Start: 2023-08-23 | End: 2023-12-23

## 2023-08-23 ASSESSMENT — ENCOUNTER SYMPTOMS
EYE PAIN: 0
BACK PAIN: 1
DIARRHEA: 0
COUGH: 0
WHEEZING: 0
CHEST TIGHTNESS: 0
RHINORRHEA: 0
SORE THROAT: 0
PHOTOPHOBIA: 0
SINUS PRESSURE: 0
VOMITING: 0
ABDOMINAL PAIN: 0
NAUSEA: 0
COLOR CHANGE: 0
CONSTIPATION: 0
SHORTNESS OF BREATH: 0

## 2023-08-23 NOTE — PROGRESS NOTES
[address] [facility]HABILITATION [place]  [address]  Dept: [phone]  Dept Fax: [phone]: [phone]    Visit Date: 8/23/2023    Penny Aleman is a 63 y.o. male who is referred for pain management evaluation and treatment per Dr. Fidel Baugh. CAGE and CAGE-AID Questions   1. In the last three months, have you felt you should cut down or stop drinking or using drugs? Yes []        No [x]     2. In the last three months, has anyone annoyed you or gotten on your nerves by telling you to cut down or stop drinking or using drugs? Yes []        No [x]     3. In the last three months, have you felt guilty or bad about how much you drink or use drugs? Yes []        No [x]     4. In the last three months, have you been waking up wanting to have an alcoholic drink or use drugs? Yes []        No [x]        Opioid Risk Tool:  Clinician Form       1. Family History of Substance Abuse: Female Male    Alcohol   []1   []3    Illegal drugs   []2   []3    Prescription drugs     []4   []4   2. Personal History of Substance Abuse:          Alcohol   []3   []3    Illegal drugs   []4   []4    Prescription drugs     []5   []5   3. Age (kelly box if between 12 and 39):     []1   []1   4. History of Preadolescent Sexual Abuse:     []3   []0   5. Psychological Disease:      Attention deficit disorder, obsessive-compulsive disorder, bipolar, schizophrenia   []2   []2      Depression     []1   []1    Scoring Totals       Total Score  Low Risk  Moderate Risk  High Risk   Risk Category   0 - 3   4 - 7   8 or Above      Patient states symptoms interfere with:  A.  General Activity:  yes   B. Mood: yes    C. Walking Ability:   yes   D. Normal Work (Includes both work outside the home and housework):   yes    E.  Relations with Other People:  yes   F. Sleep:   yes   G.  Enjoyment of Life:  yes

## 2023-08-23 NOTE — PROGRESS NOTES
HPI:     ChiefComplaint: Lumbar back pain    New pt here for  c/o low back pain BLE thighs with toes balls of feet numbness over past 3 yrs. The pain skips lower legs just in thighs and feet. Was in RLE  then now LLE. Naty Lee He did have  recent hand foot mouth and  feet are peeling and  it made the  burning numbness tingling worse. He has not seen a spine surgeon nor has he had any spine surgery. He has been doing traction and chiropractor NSAIDS Tylenol. He has not had any PT. 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 He denies nay major falls trauma or MVC that could of injured his back. , just hardworking. He c/o of not much back pain. He complains mostly of constant  burning numbness tingling down BLE pain into  lateral thigh skips LLE  then into  balls of  feet and across toes. He denies any leg weakness, falls, no abnormal gait. He does not use assistive devices. The pain is burning numbness tingling they feel cold or frostbite. He denies  PVD PAD  or  smoking. Patient pain increases with walking, standing, stairs, and housework or working at job. Treatments tried PT/HEP, ICE/HEAT, Chiropractor, NSAIDS, OTC rubs creams patches, and traction inversion  Pain description burning and numbness/tingling      Alleviating Factors: rest sitting laying  legs elevated  and tylenol. Any leg weakness NO , saddle paresthesia, bowel or bladder incontinence yes or no? NO      Numerical Pain Scale  Pain rating  scale 1-10 highest  8  lowest  3  average   6    Radiology:      Prior Injections: The patient has No Known Allergies. Subjective:      Review of Systems   Constitutional:  Positive for activity change. Negative for appetite change, chills, diaphoresis, fatigue, fever and unexpected weight change. HENT:  Negative for congestion, ear pain, hearing loss, mouth sores, nosebleeds, rhinorrhea, sinus pressure and sore throat.     Eyes:  Negative for photophobia, pain and visual

## 2023-08-24 DIAGNOSIS — G62.9 NEUROPATHY: ICD-10-CM

## 2023-08-24 DIAGNOSIS — M54.16 LUMBAR RADICULITIS: Primary | ICD-10-CM

## 2023-08-28 ENCOUNTER — HOSPITAL ENCOUNTER (OUTPATIENT)
Age: 58
Discharge: HOME | End: 2023-08-28
Payer: COMMERCIAL

## 2023-08-28 ENCOUNTER — HOSPITAL ENCOUNTER (OUTPATIENT)
Dept: GENERAL RADIOLOGY | Age: 58
Discharge: HOME | End: 2023-08-28
Payer: COMMERCIAL

## 2023-08-28 DIAGNOSIS — M47.816 LUMBAR SPONDYLOSIS: ICD-10-CM

## 2023-08-28 DIAGNOSIS — G62.9 NEUROPATHY: ICD-10-CM

## 2023-08-28 DIAGNOSIS — M54.16 LUMBAR RADICULITIS: ICD-10-CM

## 2023-08-28 PROCEDURE — 72100 X-RAY EXAM L-S SPINE 2/3 VWS: CPT

## 2023-10-02 ENCOUNTER — HOSPITAL ENCOUNTER (OUTPATIENT)
Age: 58
Discharge: HOME | End: 2023-10-02

## 2023-10-03 ENCOUNTER — HOSPITAL ENCOUNTER (OUTPATIENT)
Age: 58
Discharge: HOME | End: 2023-10-03
Payer: COMMERCIAL

## 2023-10-03 DIAGNOSIS — Z51.81 MEDICATION MONITORING ENCOUNTER: ICD-10-CM

## 2023-10-03 LAB
ALBUMIN SERPL BCG-MCNC: 4.5 G/DL (ref 3.5–5.1)
ALP SERPL-CCNC: 84 U/L (ref 38–126)
ALT SERPL W/O P-5'-P-CCNC: 39 U/L (ref 11–66)
AST SERPL-CCNC: 32 U/L (ref 5–40)
BILIRUB CONJ SERPL-MCNC: < 0.2 MG/DL (ref 0–0.3)
BILIRUB SERPL-MCNC: 0.4 MG/DL (ref 0.3–1.2)
CHOLESTEROL, FASTING: 145 MG/DL (ref 100–199)
HDLC SERPL-MCNC: 34 MG/DL
LDLC SERPL CALC-MCNC: 62 MG/DL
PROT SERPL-MCNC: 7.7 G/DL (ref 6.1–8)
TRIGLYCERIDE, FASTING: 245 MG/DL (ref 0–199)

## 2023-10-03 PROCEDURE — 36415 COLL VENOUS BLD VENIPUNCTURE: CPT

## 2023-10-03 PROCEDURE — 80061 LIPID PANEL: CPT

## 2023-10-03 PROCEDURE — 80076 HEPATIC FUNCTION PANEL: CPT

## 2023-10-12 ENCOUNTER — PROCEDURE VISIT (OUTPATIENT)
Dept: NEUROLOGY | Age: 58
End: 2023-10-12
Payer: COMMERCIAL

## 2023-10-12 DIAGNOSIS — M54.16 LUMBAR RADICULOPATHY: Primary | ICD-10-CM

## 2023-10-12 DIAGNOSIS — G62.9 NEUROPATHY: ICD-10-CM

## 2023-10-12 DIAGNOSIS — M79.604 RIGHT LEG PAIN: ICD-10-CM

## 2023-10-12 PROCEDURE — 95910 NRV CNDJ TEST 7-8 STUDIES: CPT | Performed by: PSYCHIATRY & NEUROLOGY

## 2023-10-12 PROCEDURE — 95886 MUSC TEST DONE W/N TEST COMP: CPT | Performed by: PSYCHIATRY & NEUROLOGY

## 2023-10-16 ENCOUNTER — OFFICE VISIT (OUTPATIENT)
Dept: PHYSICAL MEDICINE AND REHAB | Age: 58
End: 2023-10-16
Payer: COMMERCIAL

## 2023-10-16 VITALS
DIASTOLIC BLOOD PRESSURE: 90 MMHG | HEIGHT: 70 IN | HEART RATE: 68 BPM | SYSTOLIC BLOOD PRESSURE: 152 MMHG | WEIGHT: 225 LBS | BODY MASS INDEX: 32.21 KG/M2

## 2023-10-16 DIAGNOSIS — M47.816 LUMBAR FACET ARTHROPATHY: ICD-10-CM

## 2023-10-16 DIAGNOSIS — M54.16 LUMBAR RADICULITIS: Primary | ICD-10-CM

## 2023-10-16 DIAGNOSIS — G89.4 CHRONIC PAIN SYNDROME: ICD-10-CM

## 2023-10-16 DIAGNOSIS — M47.816 LUMBAR SPONDYLOSIS: ICD-10-CM

## 2023-10-16 DIAGNOSIS — G62.9 NEUROPATHY: ICD-10-CM

## 2023-10-16 PROCEDURE — 3077F SYST BP >= 140 MM HG: CPT | Performed by: NURSE PRACTITIONER

## 2023-10-16 PROCEDURE — 3080F DIAST BP >= 90 MM HG: CPT | Performed by: NURSE PRACTITIONER

## 2023-10-16 PROCEDURE — 99214 OFFICE O/P EST MOD 30 MIN: CPT | Performed by: NURSE PRACTITIONER

## 2023-10-16 RX ORDER — PREGABALIN 50 MG/1
50 CAPSULE ORAL 3 TIMES DAILY
Qty: 270 CAPSULE | Refills: 0 | Status: SHIPPED | OUTPATIENT
Start: 2023-10-16 | End: 2024-01-14

## 2023-10-16 ASSESSMENT — ENCOUNTER SYMPTOMS
BACK PAIN: 1
RESPIRATORY NEGATIVE: 1

## 2023-10-16 NOTE — PROGRESS NOTES
Functionality Assessment/Goals Worksheet     On a scale of 0 (Does not Interfere) to 10 (Completely Interferes)     1. Which number describes how during the past week pain has interfered with           the following:  A. General Activity:  4  B. Mood: 5  C. Walking Ability:  4  D. Normal Work (Includes both work outside the home and housework):  4  E. Relations with Other People:   3  F. Sleep:   7  G. Enjoyment of Life:   2    2. Patient Prefers to Take their Pain Medications:     [x]  On a regular basis   []  Only when necessary    []  Does not take pain medications    3. What are the Patient's Goals/Expectations for Visiting Pain Management? []  Learn about my pain    [x]  Receive Medication   []  Physical Therapy     []  Treat Depression   []  Receive Injections    []  Treat Sleep   []  Deal with Anxiety and Stress   []  Treat Opoid Dependence/Addiction   []  Other:       HPI:     ChiefComplaint: Lumbar back pain    F/U EMG lumbar XR here with wife  LUMBAR SPINE 2 VIEWS:     CLINICAL INFORMATION: Lumbar spondylosis, Lumbar radiculitis, Neuropathy     COMPARISON: No prior study. TECHNIQUE: Standard AP and lateral views of lumbar spine were obtained. IMPRESSION:  1. Minimal vertebral body spondylosis scattered in the lumbar spine. 2. Otherwise normal lumbar spine. No fracture. Disc spaces well-maintained. 3. 1 cm calcification right upper quadrant, consistent with a gallstone. EMG 10/2023          States Lyrica trial is doing well. No side effects less complaining. Noticed some relief right away. Toes are better except after work  4-5 hours   or at HS the pain is back or worse into thighs feet  toes feel like run over by a car and feel cold. With numbness tingling. He continues HEP  riding bike and continues to work. Darol Cocker  He uses CBD  Pain at highest 6-7/10  after working and  HS lowest 2/10 average 4-5  OSWESTRY DISABILITY SCORE=8/50 16%    HPI  New pt here for  c/o low back pain BLE

## 2024-01-11 ENCOUNTER — OFFICE VISIT (OUTPATIENT)
Dept: PAIN MANAGEMENT | Age: 59
End: 2024-01-11
Payer: COMMERCIAL

## 2024-01-11 VITALS
WEIGHT: 222 LBS | DIASTOLIC BLOOD PRESSURE: 68 MMHG | HEART RATE: 70 BPM | HEIGHT: 70 IN | BODY MASS INDEX: 31.78 KG/M2 | SYSTOLIC BLOOD PRESSURE: 122 MMHG

## 2024-01-11 DIAGNOSIS — M54.16 LUMBAR RADICULITIS: ICD-10-CM

## 2024-01-11 DIAGNOSIS — M46.1 SI (SACROILIAC) JOINT INFLAMMATION (HCC): ICD-10-CM

## 2024-01-11 DIAGNOSIS — M47.816 LUMBAR SPONDYLOSIS: ICD-10-CM

## 2024-01-11 DIAGNOSIS — G62.9 NEUROPATHY: Primary | ICD-10-CM

## 2024-01-11 PROCEDURE — 3078F DIAST BP <80 MM HG: CPT | Performed by: NURSE PRACTITIONER

## 2024-01-11 PROCEDURE — 99214 OFFICE O/P EST MOD 30 MIN: CPT | Performed by: NURSE PRACTITIONER

## 2024-01-11 PROCEDURE — 3074F SYST BP LT 130 MM HG: CPT | Performed by: NURSE PRACTITIONER

## 2024-01-11 RX ORDER — PREGABALIN 50 MG/1
50 CAPSULE ORAL 3 TIMES DAILY
Qty: 270 CAPSULE | Refills: 0 | Status: SHIPPED | OUTPATIENT
Start: 2024-01-11 | End: 2024-04-10

## 2024-01-11 ASSESSMENT — ENCOUNTER SYMPTOMS
RESPIRATORY NEGATIVE: 1
BACK PAIN: 1

## 2024-01-11 NOTE — PROGRESS NOTES
Functionality Assessment/Goals Worksheet     On a scale of 0 (Does not Interfere) to 10 (Completely Interferes)     1.  Which number describes how during the past week pain has interfered with           the following:  A.  General Activity:  5  B.  Mood: 4  C.  Walking Ability:  2  D.  Normal Work (Includes both work outside the home and housework):  5  E.  Relations with Other People:   2  F.  Sleep:   4  G.  Enjoyment of Life:   1    2.  Patient Prefers to Take their Pain Medications:     [x]  On a regular basis   []  Only when necessary    []  Does not take pain medications    3.  What are the Patient's Goals/Expectations for Visiting Pain Management?     [x]  Learn about my pain    []  Receive Medication   []  Physical Therapy     []  Treat Depression   []  Receive Injections    []  Treat Sleep   []  Deal with Anxiety and Stress   []  Treat Opoid Dependence/Addiction   []  Other:

## 2024-01-11 NOTE — PROGRESS NOTES
Functionality Assessment/Goals Worksheet     On a scale of 0 (Does not Interfere) to 10 (Completely Interferes)     1.  Which number describes how during the past week pain has interfered with           the following:  A.  General Activity:  4  B.  Mood: 5  C.  Walking Ability:  4  D.  Normal Work (Includes both work outside the home and housework):  4  E.  Relations with Other People:   3  F.  Sleep:   7  G.  Enjoyment of Life:   2    2.  Patient Prefers to Take their Pain Medications:     [x]  On a regular basis   []  Only when necessary    []  Does not take pain medications    3.  What are the Patient's Goals/Expectations for Visiting Pain Management?     []  Learn about my pain    [x]  Receive Medication   []  Physical Therapy     []  Treat Depression   []  Receive Injections    []  Treat Sleep   []  Deal with Anxiety and Stress   []  Treat Opoid Dependence/Addiction   []  Other:       HPI:     ChiefComplaint: Lumbar back pain    F/U Here with wife. No new health issues. States Lyrica has helped 50-55%. He still has some thigh and toe pain but much better and more tolerable.   He is sleeping better but does have some toe pain at HS.  He  stretches and uses bike before pain. He has loss some weight.       He continues to work.. He uses CBD      Pain at highest 6-7/10  after working and  HS lowest 2/10 average 4  OSWESTRY DISABILITY SCORE=8/50 16%    HPI  New pt here for  c/o low back pain BLE thighs with toes balls of feet numbness over past 3 yrs. The pain skips lower legs just in thighs and feet.  Was in RLE  then now LLE.. He did have  recent hand foot mouth and  feet are peeling and  it made the  burning numbness tingling worse.   He has not seen a spine surgeon nor has he had any spine surgery.  He has been doing traction and chiropractor NSAIDS Tylenol. He has not had any PT.     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  He denies nay major falls trauma or MVC that could of injured his

## 2024-01-11 NOTE — PATIENT INSTRUCTIONS
Multidisciplinary Pain Management:   In the presence of complex, chronic, and multi-factorial pain, the importance of a multidisciplinary approach to pain management in the patient’s management regimen was emphasized and discussed in great detail.   PHYSICAL THERAPY: Patient is advised to see a physical therapist for gentle stretching exercises and conditioning exercises for management of pain.   PSYCHOLOGY: Patient is advised to see a clinical pain psychologist, for the psychosocial aspect of pain care through coping skills, relaxation strategies, cognitive group therapy etc.   OBESITY: Patient is advised to seek nutrition consult to help in managing their weight to decrease its impact on pain.   The patient was also advised to continue physical therapy and stretching exercises at home and cognitive behavioral and/or group therapy.

## 2024-04-11 ENCOUNTER — OFFICE VISIT (OUTPATIENT)
Dept: PAIN MANAGEMENT | Age: 59
End: 2024-04-11
Payer: COMMERCIAL

## 2024-04-11 VITALS
WEIGHT: 220 LBS | SYSTOLIC BLOOD PRESSURE: 150 MMHG | HEIGHT: 70 IN | HEART RATE: 70 BPM | DIASTOLIC BLOOD PRESSURE: 88 MMHG | BODY MASS INDEX: 31.5 KG/M2

## 2024-04-11 DIAGNOSIS — G62.9 NEUROPATHY: Primary | ICD-10-CM

## 2024-04-11 DIAGNOSIS — M46.1 SI (SACROILIAC) JOINT INFLAMMATION (HCC): ICD-10-CM

## 2024-04-11 DIAGNOSIS — M47.816 LUMBAR SPONDYLOSIS: ICD-10-CM

## 2024-04-11 DIAGNOSIS — M54.16 LUMBAR RADICULITIS: ICD-10-CM

## 2024-04-11 PROCEDURE — 99214 OFFICE O/P EST MOD 30 MIN: CPT | Performed by: NURSE PRACTITIONER

## 2024-04-11 PROCEDURE — 3079F DIAST BP 80-89 MM HG: CPT | Performed by: NURSE PRACTITIONER

## 2024-04-11 PROCEDURE — 3077F SYST BP >= 140 MM HG: CPT | Performed by: NURSE PRACTITIONER

## 2024-04-11 RX ORDER — PREGABALIN 75 MG/1
75 CAPSULE ORAL 3 TIMES DAILY
Qty: 90 CAPSULE | Refills: 2 | Status: SHIPPED | OUTPATIENT
Start: 2024-04-11 | End: 2024-07-10

## 2024-04-11 ASSESSMENT — ENCOUNTER SYMPTOMS
RESPIRATORY NEGATIVE: 1
BACK PAIN: 1

## 2024-04-11 NOTE — PROGRESS NOTES
Functionality Assessment/Goals Worksheet     On a scale of 0 (Does not Interfere) to 10 (Completely Interferes)     1.  Which number describes how during the past week pain has interfered with           the following:  A.  General Activity:  4  B.  Mood: 5  C.  Walking Ability:  4  D.  Normal Work (Includes both work outside the home and housework):  4  E.  Relations with Other People:   3  F.  Sleep:   7  G.  Enjoyment of Life:   2    2.  Patient Prefers to Take their Pain Medications:     [x]  On a regular basis   []  Only when necessary    []  Does not take pain medications    3.  What are the Patient's Goals/Expectations for Visiting Pain Management?     []  Learn about my pain    [x]  Receive Medication   []  Physical Therapy     []  Treat Depression   []  Receive Injections    []  Treat Sleep   []  Deal with Anxiety and Stress   []  Treat Opoid Dependence/Addiction   []  Other:       HPI:     ChiefComplaint: Lumbar back pain    F/U Here with wife. No new health issues except sinus issues. States Lyrica has helped 50-55%. He still has some thigh and toe pain but much better and more tolerable.   He is sleeping better but does have some toe pain at HS.  He  stretches and uses bike before pain. He has loss some weight.    By end of day his feet toes thigh pain  will increase, when he rest gets his legs and feet up it resolves some.    He continues to work.. He uses CBD      Pain at highest 6-7/10  after working and  HS lowest 2/10 average 4  OSWESTRY DISABILITY SCORE=8/50 16%    HPI  New pt here for  c/o low back pain BLE thighs with toes balls of feet numbness over past 3 yrs. The pain skips lower legs just in thighs and feet.  Was in RLE  then now LLE.. He did have  recent hand foot mouth and  feet are peeling and  it made the  burning numbness tingling worse.   He has not seen a spine surgeon nor has he had any spine surgery.  He has been doing traction and chiropractor NSAIDS Tylenol. He has not had any PT.

## 2024-04-11 NOTE — PROGRESS NOTES
Functionality Assessment/Goals Worksheet     On a scale of 0 (Does not Interfere) to 10 (Completely Interferes)     1.  Which number describes how during the past week pain has interfered with           the following:  A.  General Activity:  6  B.  Mood: 6  C.  Walking Ability:  4  D.  Normal Work (Includes both work outside the home and housework):  5  E.  Relations with Other People:   5  F.  Sleep:   2  G.  Enjoyment of Life:   4    2.  Patient Prefers to Take their Pain Medications:     [x]  On a regular basis   []  Only when necessary    []  Does not take pain medications    3.  What are the Patient's Goals/Expectations for Visiting Pain Management?     []  Learn about my pain    [x]  Receive Medication   []  Physical Therapy     []  Treat Depression   []  Receive Injections    []  Treat Sleep   []  Deal with Anxiety and Stress   []  Treat Opoid Dependence/Addiction   []  Other:

## 2024-06-10 DIAGNOSIS — N20.0 NEPHROLITHIASIS: ICD-10-CM

## 2024-06-11 ENCOUNTER — HOSPITAL ENCOUNTER (OUTPATIENT)
Age: 59
Discharge: HOME | End: 2024-06-11
Payer: COMMERCIAL

## 2024-06-11 DIAGNOSIS — N20.0 NEPHROLITHIASIS: ICD-10-CM

## 2024-06-11 LAB
ANION GAP SERPL CALC-SCNC: 12 MEQ/L (ref 8–16)
BUN SERPL-MCNC: 18 MG/DL (ref 7–22)
CALCIUM SERPL-MCNC: 9.4 MG/DL (ref 8.5–10.5)
CHLORIDE SERPL-SCNC: 102 MEQ/L (ref 98–111)
CO2 SERPL-SCNC: 23 MEQ/L (ref 23–33)
CREAT SERPL-MCNC: 1.1 MG/DL (ref 0.4–1.2)
GFR SERPL CREATININE-BSD FRML MDRD: 78 ML/MIN/1.73M2
GLUCOSE SERPL-MCNC: 90 MG/DL (ref 70–108)
POTASSIUM SERPL-SCNC: 5.2 MEQ/L (ref 3.5–5.2)
SODIUM SERPL-SCNC: 137 MEQ/L (ref 135–145)

## 2024-06-11 PROCEDURE — 80048 BASIC METABOLIC PNL TOTAL CA: CPT

## 2024-06-11 PROCEDURE — 36415 COLL VENOUS BLD VENIPUNCTURE: CPT

## 2024-06-13 RX ORDER — POTASSIUM CITRATE 10 MEQ/1
10 TABLET, EXTENDED RELEASE ORAL 4 TIMES DAILY
Qty: 120 TABLET | Refills: 0 | Status: SHIPPED | OUTPATIENT
Start: 2024-06-13

## 2024-06-19 ENCOUNTER — OFFICE VISIT (OUTPATIENT)
Dept: NEPHROLOGY | Age: 59
End: 2024-06-19
Payer: COMMERCIAL

## 2024-06-19 VITALS
OXYGEN SATURATION: 96 % | WEIGHT: 226 LBS | SYSTOLIC BLOOD PRESSURE: 135 MMHG | DIASTOLIC BLOOD PRESSURE: 85 MMHG | HEART RATE: 58 BPM | BODY MASS INDEX: 32.43 KG/M2

## 2024-06-19 DIAGNOSIS — N20.0 NEPHROLITHIASIS: Primary | ICD-10-CM

## 2024-06-19 DIAGNOSIS — N20.0 LEFT NEPHROLITHIASIS: ICD-10-CM

## 2024-06-19 PROCEDURE — 99214 OFFICE O/P EST MOD 30 MIN: CPT | Performed by: INTERNAL MEDICINE

## 2024-06-19 PROCEDURE — 3075F SYST BP GE 130 - 139MM HG: CPT | Performed by: INTERNAL MEDICINE

## 2024-06-19 PROCEDURE — 3079F DIAST BP 80-89 MM HG: CPT | Performed by: INTERNAL MEDICINE

## 2024-06-19 NOTE — PROGRESS NOTES
Renal Progress Note    Assessment and Plan:      Diagnosis Orders   1. Nephrolithiasis  LITHOLINK    Basic Metabolic Panel      2. Left nephrolithiasis                  PLAN:  I discussed my thoughts with patient, family  They understood  Addressed their questions  L[facility]olink report reviewed with them  Very excellent urine output of 4 L  Reviewed labs with him   Serum creatinine good  Medications reviewed  No changes  Return visit in 12 months            Patient Active Problem List   Diagnosis    Primary hypertension    Hematuria    History of kidney stones    Prediabetes    Hypertriglyceridemia    Nephrolithiasis           Subjective:   Chief complaint:  Chief Complaint   Patient presents with    Follow-up     1 year f/u nephrolithiasis      HPI:This is a follow up visit for Mr. Grupo Gusman here for return appointment.  I see him for nephrolithiasis.  He was last seen about 12 months ago.  Doing well with no complaint.  No stone attack since then.  No hospitalizations.  No flank pain.  No blood in the urine.  No recent chest pain or shortness of breath.        ROS:  Pertinent positives stated above in HPI. All other systems were reviewed and were negative.  Medications:     Current Outpatient Medications   Medication Sig Dispense Refill    potassium citrate (UROCIT-K) 10 MEQ (1080 MG) extended release tablet TAKE 1 TABLET BY MOUTH IN THE MORNING, AT NOON, IN THE EVENING , AND AT BEDTIME 120 tablet 0    pregabalin (LYRICA) 75 MG capsule Take 1 capsule by mouth 3 times daily for 90 days. Max Daily Amount: 225 mg 90 capsule 2    metFORMIN (GLUCOPHAGE-XR) 500 MG extended release tablet Take 1 tablet by mouth once daily with breakfast 90 tablet 0    simvastatin (ZOCOR) 20 MG tablet Take 1 tablet by mouth nightly 90 tablet 1    losartan (COZAAR) 100 MG tablet Take 1 tablet by mouth daily 90 tablet 3    Fexofenadine HCl (ALLEGRA PO) Take by mouth daily      acetaminophen (TYLENOL) 650 MG extended release tablet Take 2

## 2024-07-01 PROBLEM — N20.0 NEPHROLITHIASIS: Status: RESOLVED | Noted: 2022-12-21 | Resolved: 2024-07-01

## 2024-07-01 PROBLEM — R73.03 PREDIABETES: Status: RESOLVED | Noted: 2022-05-18 | Resolved: 2024-07-01

## 2024-07-11 ENCOUNTER — OFFICE VISIT (OUTPATIENT)
Dept: PAIN MANAGEMENT | Age: 59
End: 2024-07-11
Payer: COMMERCIAL

## 2024-07-11 VITALS
HEIGHT: 70 IN | WEIGHT: 222 LBS | SYSTOLIC BLOOD PRESSURE: 128 MMHG | BODY MASS INDEX: 31.78 KG/M2 | DIASTOLIC BLOOD PRESSURE: 74 MMHG

## 2024-07-11 DIAGNOSIS — M47.816 LUMBAR SPONDYLOSIS: ICD-10-CM

## 2024-07-11 DIAGNOSIS — G62.9 NEUROPATHY: ICD-10-CM

## 2024-07-11 DIAGNOSIS — M46.1 SI (SACROILIAC) JOINT INFLAMMATION (HCC): ICD-10-CM

## 2024-07-11 DIAGNOSIS — M54.16 LUMBAR RADICULITIS: ICD-10-CM

## 2024-07-11 PROCEDURE — 99214 OFFICE O/P EST MOD 30 MIN: CPT | Performed by: NURSE PRACTITIONER

## 2024-07-11 PROCEDURE — 3074F SYST BP LT 130 MM HG: CPT | Performed by: NURSE PRACTITIONER

## 2024-07-11 PROCEDURE — 3078F DIAST BP <80 MM HG: CPT | Performed by: NURSE PRACTITIONER

## 2024-07-11 RX ORDER — PREGABALIN 75 MG/1
75 CAPSULE ORAL 3 TIMES DAILY
Qty: 90 CAPSULE | Refills: 2 | Status: SHIPPED | OUTPATIENT
Start: 2024-07-11 | End: 2024-10-09

## 2024-07-11 ASSESSMENT — ENCOUNTER SYMPTOMS
BACK PAIN: 1
RESPIRATORY NEGATIVE: 1

## 2024-07-11 NOTE — PROGRESS NOTES
[facility] PROFESSIONAL SERVS  [facility] NEUROSCIENCE AND REHABILITATION [address]  [facility] [address]  Dept: [phone]  Dept Fax: [phone]  Loc: [phone]    Visit Date: 7/11/2024    Functionality Assessment/Goals Worksheet     On a scale of 0 (Does not Interfere) to 10 (Completely Interferes)     1.  Which number describes how during the past week pain has interfered with       the following:  A.  General Activity:  2  B.  Mood: 2  C.  Walking Ability:  2  D.  Normal Work (Includes both work outside the home and housework):  2  E.  Relations with Other People:   2  F.  Sleep:   2  G.  Enjoyment of Life:   2    2.  Patient Prefers to Take their Pain Medications:     [x]  On a regular basis   []  Only when necessary    []  Does not take pain medications    3.  What are the Patient's Goals/Expectations for Visiting Pain Management?     [x]  Learn about my pain    []  Receive Medication   []  Physical Therapy     []  Treat Depression   []  Receive Injections    []  Treat Sleep   []  Deal with Anxiety and Stress   []  Treat Opoid Dependence/Addiction   []  Other:

## 2024-07-11 NOTE — PROGRESS NOTES
Functionality Assessment/Goals Worksheet     On a scale of 0 (Does not Interfere) to 10 (Completely Interferes)     1.  Which number describes how during the past week pain has interfered with           the following:  A.  General Activity:  4  B.  Mood: 5  C.  Walking Ability:  4  D.  Normal Work (Includes both work outside the home and housework):  4  E.  Relations with Other People:   3  F.  Sleep:   7  G.  Enjoyment of Life:   2    2.  Patient Prefers to Take their Pain Medications:     [x]  On a regular basis   []  Only when necessary    []  Does not take pain medications    3.  What are the Patient's Goals/Expectations for Visiting Pain Management?     []  Learn about my pain    [x]  Receive Medication   []  Physical Therapy     []  Treat Depression   []  Receive Injections    []  Treat Sleep   []  Deal with Anxiety and Stress   []  Treat Opoid Dependence/Addiction   []  Other:       HPI:     ChiefComplaint: Lumbar back pain    F/U Here with wife. No new health issues. States Lyrica has helped 50-55%. He still has some mild thigh and toe pain but much better and more tolerable than before trying the Lyrica.    He is sleeping better.  He  stretches and uses bike.  He has loss some weight.  More  leg foot toe pain after on feet all day and lifting a lot.   By end of day his feet toes thigh pain  will increase, when he rest gets his legs and feet up it resolves some.   He continues to work.. He uses CBD      Pain at highest 6-7/10  after working and  HS lowest 2/10 average 4  OSWESTRY DISABILITY SCORE=8/50 16%    HPI  New pt here for  c/o low back pain BLE thighs with toes balls of feet numbness over past 3 yrs. The pain skips lower legs just in thighs and feet.  Was in RLE  then now LLE.. He did have  recent hand foot mouth and  feet are peeling and  it made the  burning numbness tingling worse.   He has not seen a spine surgeon nor has he had any spine surgery.  He has been doing traction and chiropractor

## 2024-07-31 RX ORDER — POTASSIUM CITRATE 10 MEQ/1
10 TABLET, EXTENDED RELEASE ORAL 4 TIMES DAILY
Qty: 120 TABLET | Refills: 5 | Status: SHIPPED | OUTPATIENT
Start: 2024-07-31

## 2024-07-31 RX ORDER — POTASSIUM CITRATE 10 MEQ/1
TABLET, EXTENDED RELEASE ORAL
Qty: 270 TABLET | Refills: 3 | Status: SHIPPED | OUTPATIENT
Start: 2024-07-31

## 2024-10-10 ENCOUNTER — OFFICE VISIT (OUTPATIENT)
Dept: PAIN MANAGEMENT | Age: 59
End: 2024-10-10
Payer: COMMERCIAL

## 2024-10-10 VITALS
DIASTOLIC BLOOD PRESSURE: 86 MMHG | BODY MASS INDEX: 31.5 KG/M2 | HEART RATE: 70 BPM | SYSTOLIC BLOOD PRESSURE: 136 MMHG | WEIGHT: 220 LBS | HEIGHT: 70 IN

## 2024-10-10 DIAGNOSIS — M47.816 LUMBAR SPONDYLOSIS: ICD-10-CM

## 2024-10-10 DIAGNOSIS — G62.9 NEUROPATHY: ICD-10-CM

## 2024-10-10 DIAGNOSIS — M54.16 LUMBAR RADICULITIS: ICD-10-CM

## 2024-10-10 DIAGNOSIS — M46.1 SI (SACROILIAC) JOINT INFLAMMATION (HCC): ICD-10-CM

## 2024-10-10 PROCEDURE — 3075F SYST BP GE 130 - 139MM HG: CPT | Performed by: NURSE PRACTITIONER

## 2024-10-10 PROCEDURE — 99214 OFFICE O/P EST MOD 30 MIN: CPT | Performed by: NURSE PRACTITIONER

## 2024-10-10 PROCEDURE — 3079F DIAST BP 80-89 MM HG: CPT | Performed by: NURSE PRACTITIONER

## 2024-10-10 RX ORDER — PREGABALIN 75 MG/1
75 CAPSULE ORAL 3 TIMES DAILY
Qty: 90 CAPSULE | Refills: 2 | Status: SHIPPED | OUTPATIENT
Start: 2024-10-10 | End: 2025-01-08

## 2024-10-10 ASSESSMENT — ENCOUNTER SYMPTOMS
BACK PAIN: 1
RESPIRATORY NEGATIVE: 1

## 2024-10-10 NOTE — PROGRESS NOTES
Functionality Assessment/Goals Worksheet     On a scale of 0 (Does not Interfere) to 10 (Completely Interferes)     1.  Which number describes how during the past week pain has interfered with           the following:  A.  General Activity:  4  B.  Mood: 3  C.  Walking Ability:  2  D.  Normal Work (Includes both work outside the home and housework):  2  E.  Relations with Other People:   0  F.  Sleep:   0  G.  Enjoyment of Life:   2    2.  Patient Prefers to Take their Pain Medications:     []  On a regular basis   []  Only when necessary    []  Does not take pain medications    3.  What are the Patient's Goals/Expectations for Visiting Pain Management?     []  Learn about my pain    [x]  Receive Medication   []  Physical Therapy     []  Treat Depression   []  Receive Injections    []  Treat Sleep   []  Deal with Anxiety and Stress   []  Treat Opoid Dependence/Addiction   []  Other:

## 2024-10-10 NOTE — PROGRESS NOTES
Functionality Assessment/Goals Worksheet     On a scale of 0 (Does not Interfere) to 10 (Completely Interferes)     1.  Which number describes how during the past week pain has interfered with           the following:  A.  General Activity:  4  B.  Mood: 5  C.  Walking Ability:  4  D.  Normal Work (Includes both work outside the home and housework):  4  E.  Relations with Other People:   3  F.  Sleep:   7  G.  Enjoyment of Life:   2    2.  Patient Prefers to Take their Pain Medications:     [x]  On a regular basis   []  Only when necessary    []  Does not take pain medications    3.  What are the Patient's Goals/Expectations for Visiting Pain Management?     []  Learn about my pain    [x]  Receive Medication   []  Physical Therapy     []  Treat Depression   []  Receive Injections    []  Treat Sleep   []  Deal with Anxiety and Stress   []  Treat Opoid Dependence/Addiction   []  Other:       HPI:     ChiefComplaint: Lumbar back pain    F/U Here with wife. No new health issues. States Lyrica has helped 50-55%. He still has some mild thigh and toe pain but much better and more tolerable than before trying the Lyrica. Sleeping better. BLE pain depends on activity   Pain at highest 1-2/10 mornings then after end of day 4/10   He is sleeping better.  He  stretches and uses bike.  He has loss some weight.  More  leg foot toe pain after on feet all day and lifting a lot.   By end of day his feet toes thigh pain  will increase, when he rest gets his legs and feet up it resolves some.   He continues to work.. He uses CBD      Pain at highest 6-7/10  after working and  HS lowest 2/10 average 4  OSWESTRY DISABILITY SCORE=8/50 16%    HPI  New pt here for  c/o low back pain BLE thighs with toes balls of feet numbness over past 3 yrs. The pain skips lower legs just in thighs and feet.  Was in RLE  then now LLE.. He did have  recent hand foot mouth and  feet are peeling and  it made the  burning numbness tingling worse.   He has not

## 2025-01-09 ENCOUNTER — TELEPHONE (OUTPATIENT)
Dept: PAIN MANAGEMENT | Age: 60
End: 2025-01-09

## 2025-01-09 ENCOUNTER — OFFICE VISIT (OUTPATIENT)
Dept: PAIN MANAGEMENT | Age: 60
End: 2025-01-09
Payer: COMMERCIAL

## 2025-01-09 VITALS
HEIGHT: 70 IN | SYSTOLIC BLOOD PRESSURE: 126 MMHG | DIASTOLIC BLOOD PRESSURE: 70 MMHG | WEIGHT: 225 LBS | BODY MASS INDEX: 32.21 KG/M2

## 2025-01-09 DIAGNOSIS — M47.816 LUMBAR SPONDYLOSIS: ICD-10-CM

## 2025-01-09 DIAGNOSIS — G62.9 NEUROPATHY: ICD-10-CM

## 2025-01-09 DIAGNOSIS — M54.16 LUMBAR RADICULITIS: Primary | ICD-10-CM

## 2025-01-09 DIAGNOSIS — M46.1 SI (SACROILIAC) JOINT INFLAMMATION (HCC): ICD-10-CM

## 2025-01-09 PROCEDURE — 99214 OFFICE O/P EST MOD 30 MIN: CPT | Performed by: NURSE PRACTITIONER

## 2025-01-09 PROCEDURE — 3074F SYST BP LT 130 MM HG: CPT | Performed by: NURSE PRACTITIONER

## 2025-01-09 PROCEDURE — 3078F DIAST BP <80 MM HG: CPT | Performed by: NURSE PRACTITIONER

## 2025-01-09 RX ORDER — PREGABALIN 75 MG/1
75 CAPSULE ORAL 3 TIMES DAILY
Qty: 90 CAPSULE | Refills: 2 | Status: SHIPPED | OUTPATIENT
Start: 2025-01-09 | End: 2025-04-09

## 2025-01-09 ASSESSMENT — ENCOUNTER SYMPTOMS
BACK PAIN: 1
RESPIRATORY NEGATIVE: 1

## 2025-01-09 NOTE — PROGRESS NOTES
Functionality Assessment/Goals Worksheet     On a scale of 0 (Does not Interfere) to 10 (Completely Interferes)     1.  Which number describes how during the past week pain has interfered with           the following:  A.  General Activity:  4  B.  Mood: 5  C.  Walking Ability:  4  D.  Normal Work (Includes both work outside the home and housework):  4  E.  Relations with Other People:   3  F.  Sleep:   7  G.  Enjoyment of Life:   2    2.  Patient Prefers to Take their Pain Medications:     [x]  On a regular basis   []  Only when necessary    []  Does not take pain medications    3.  What are the Patient's Goals/Expectations for Visiting Pain Management?     []  Learn about my pain    [x]  Receive Medication   []  Physical Therapy     []  Treat Depression   []  Receive Injections    []  Treat Sleep   []  Deal with Anxiety and Stress   []  Treat Opoid Dependence/Addiction   []  Other:       HPI:     ChiefComplaint: Lumbar back pain    F/U   Had sinusitis URI. On steroids and antibiotic.  Still doing well on Lyrica and uses CBD to legs.  States he has continued 50-60% pain relief or benefit,   Here with wife. . He still has some mild thigh and toe pain but much better and more tolerable than before trying the Lyrica. Sleeping better. BLE pain depends on activity   Pain at highest 1-2/10 mornings then after end of day 4/10   He is sleeping better.  He  stretches and uses bike.  He has loss some weight.  More  leg foot toe pain after on feet all day and lifting a lot.   By end of day his feet toes thigh pain  will increase, when he rest gets his legs and feet up it resolves some.   He continues to work.. He uses CBD      Pain at highest 6-7/10  after working and  HS lowest 2/10 average 4  OSWESTRY DISABILITY SCORE=8/50 16%    HPI  New pt here for  c/o low back pain BLE thighs with toes balls of feet numbness over past 3 yrs. The pain skips lower legs just in thighs and feet.  Was in RLE  then now LLE.. He did have

## 2025-01-09 NOTE — PROGRESS NOTES
Functionality Assessment/Goals Worksheet     On a scale of 0 (Does not Interfere) to 10 (Completely Interferes)     1.  Which number describes how during the past week pain has interfered with           the following:  A.  General Activity:  2  B.  Mood: 2  C.  Walking Ability:  2  D.  Normal Work (Includes both work outside the home and housework):  2  E.  Relations with Other People:   0  F.  Sleep:   0  G.  Enjoyment of Life:   0    2.  Patient Prefers to Take their Pain Medications:     [x]  On a regular basis   []  Only when necessary    []  Does not take pain medications    3.  What are the Patient's Goals/Expectations for Visiting Pain Management?     []  Learn about my pain    [x]  Receive Medication   []  Physical Therapy     []  Treat Depression   []  Receive Injections    []  Treat Sleep   []  Deal with Anxiety and Stress   []  Treat Opoid Dependence/Addiction   []  Other:

## 2025-01-30 RX ORDER — POTASSIUM CITRATE 10 MEQ/1
10 TABLET, EXTENDED RELEASE ORAL 4 TIMES DAILY
Qty: 270 TABLET | Refills: 5 | Status: SHIPPED | OUTPATIENT
Start: 2025-01-30

## 2025-01-30 NOTE — TELEPHONE ENCOUNTER
Patient called in requesting 1 year supply of potassium Citrate. Script pending to [facility] in [place].

## 2025-04-02 ENCOUNTER — OFFICE VISIT (OUTPATIENT)
Dept: PHYSICAL MEDICINE AND REHAB | Age: 60
End: 2025-04-02
Payer: COMMERCIAL

## 2025-04-02 VITALS
DIASTOLIC BLOOD PRESSURE: 92 MMHG | SYSTOLIC BLOOD PRESSURE: 146 MMHG | WEIGHT: 222 LBS | HEIGHT: 70 IN | BODY MASS INDEX: 31.78 KG/M2

## 2025-04-02 DIAGNOSIS — M47.816 LUMBAR FACET ARTHROPATHY: ICD-10-CM

## 2025-04-02 DIAGNOSIS — M47.816 LUMBAR SPONDYLOSIS: ICD-10-CM

## 2025-04-02 DIAGNOSIS — G62.9 NEUROPATHY: ICD-10-CM

## 2025-04-02 DIAGNOSIS — M54.16 LUMBAR RADICULITIS: Primary | ICD-10-CM

## 2025-04-02 DIAGNOSIS — M46.1 SI (SACROILIAC) JOINT INFLAMMATION: ICD-10-CM

## 2025-04-02 DIAGNOSIS — G89.4 CHRONIC PAIN SYNDROME: ICD-10-CM

## 2025-04-02 PROCEDURE — 99214 OFFICE O/P EST MOD 30 MIN: CPT | Performed by: NURSE PRACTITIONER

## 2025-04-02 PROCEDURE — 3080F DIAST BP >= 90 MM HG: CPT | Performed by: NURSE PRACTITIONER

## 2025-04-02 PROCEDURE — 3077F SYST BP >= 140 MM HG: CPT | Performed by: NURSE PRACTITIONER

## 2025-04-02 RX ORDER — PREGABALIN 75 MG/1
75 CAPSULE ORAL 3 TIMES DAILY
Qty: 90 CAPSULE | Refills: 2 | Status: SHIPPED | OUTPATIENT
Start: 2025-04-02 | End: 2025-07-01

## 2025-04-02 ASSESSMENT — ENCOUNTER SYMPTOMS
BACK PAIN: 1
RESPIRATORY NEGATIVE: 1

## 2025-04-02 NOTE — PROGRESS NOTES
Functionality Assessment/Goals Worksheet     On a scale of 0 (Does not Interfere) to 10 (Completely Interferes)     1.  Which number describes how during the past week pain has interfered with           the following:  A.  General Activity:  3  B.  Mood: 3  C.  Walking Ability:  2  D.  Normal Work (Includes both work outside the home and housework):  3  E.  Relations with Other People:   2  F.  Sleep:   0  G.  Enjoyment of Life:   0    2.  Patient Prefers to Take their Pain Medications:     [x]  On a regular basis   []  Only when necessary    []  Does not take pain medications    3.  What are the Patient's Goals/Expectations for Visiting Pain Management?     []  Learn about my pain    [x]  Receive Medication   []  Physical Therapy     []  Treat Depression   []  Receive Injections    []  Treat Sleep   []  Deal with Anxiety and Stress   []  Treat Opoid Dependence/Addiction   []  Other:                                
Perception: Attention and perception normal.         Mood and Affect: Mood and affect normal.         Speech: Speech normal.         Behavior: Behavior normal. Behavior is cooperative.         Thought Content: Thought content normal.         Cognition and Memory: Cognition and memory normal.         Judgment: Judgment normal.              Assessment:     1. Lumbar radiculitis    2. Neuropathy    3. Lumbar spondylosis    4. Lumbar facet arthropathy    5. Chronic pain syndrome    6. SI (sacroiliac) joint inflammation                 Assessment & Plan   Plan:        Testing, Labs or Radiology Reviewed: Lumbar MRI   Labs: Reviewed, Lumbar XR EMG BLE  Procedures: none discussed HARLAN GARCIA    Discussed with patient about risks with procedure including infection, reaction to medication, increased pain, or bleeding.  Medications Lyrica 75 mg TID tolerating well  If patient is on blood thinners will need approval to hold: yes or no:NO   Does patient have implanted device Stimulator, AICD or Pacemaker etc? NO  PT   BMP Liver Function labs reviewed from  6/2024          Meds. Prescribed:   Orders Placed This Encounter   Medications    pregabalin (LYRICA) 75 MG capsule     Sig: Take 1 capsule by mouth 3 times daily for 90 days. Max Daily Amount: 225 mg     Dispense:  90 capsule     Refill:  2       Return in about 3 months (around 7/1/2025) for LIMA morning.         Electronically signed by MIKAELA Knutson CNP on 4/2/2025 at 9:00 AM

## 2025-06-10 ENCOUNTER — TELEPHONE (OUTPATIENT)
Dept: NEPHROLOGY | Age: 60
End: 2025-06-10

## 2025-06-10 ENCOUNTER — HOSPITAL ENCOUNTER (OUTPATIENT)
Age: 60
Discharge: HOME | End: 2025-06-10
Payer: COMMERCIAL

## 2025-06-10 ENCOUNTER — HOSPITAL ENCOUNTER (EMERGENCY)
Age: 60
Discharge: HOME | End: 2025-06-10
Attending: EMERGENCY MEDICINE
Payer: COMMERCIAL

## 2025-06-10 VITALS
DIASTOLIC BLOOD PRESSURE: 79 MMHG | RESPIRATION RATE: 17 BRPM | WEIGHT: 230 LBS | TEMPERATURE: 97.8 F | HEART RATE: 68 BPM | BODY MASS INDEX: 33 KG/M2 | SYSTOLIC BLOOD PRESSURE: 139 MMHG | OXYGEN SATURATION: 95 %

## 2025-06-10 DIAGNOSIS — E87.5 HYPERKALEMIA: Primary | ICD-10-CM

## 2025-06-10 DIAGNOSIS — N20.0 NEPHROLITHIASIS: ICD-10-CM

## 2025-06-10 LAB
ALBUMIN SERPL BCP-MCNC: 4 GM/DL (ref 3.4–5)
ALP SERPL-CCNC: 118 U/L (ref 46–116)
ALT SERPL W P-5'-P-CCNC: 48 U/L (ref 14–63)
ANION GAP SERPL CALC-SCNC: 12 MEQ/L (ref 8–16)
ANION GAP SERPL CALC-SCNC: 8 MEQ/L (ref 8–16)
AST SERPL W P-5'-P-CCNC: 26 U/L (ref 15–37)
BILIRUB SERPL-MCNC: 0.3 MG/DL (ref 0.2–1)
BUN SERPL-MCNC: 28 MG/DL (ref 7–18)
BUN SERPL-MCNC: 28 MG/DL (ref 8–23)
CALCIUM SERPL-MCNC: 8.7 MG/DL (ref 8.5–10.1)
CALCIUM SERPL-MCNC: 9.7 MG/DL (ref 8.6–10)
CHLORIDE SERPL-SCNC: 103 MEQ/L (ref 98–107)
CHLORIDE SERPL-SCNC: 103 MEQ/L (ref 98–111)
CO2 SERPL-SCNC: 22 MEQ/L (ref 22–29)
CO2 SERPL-SCNC: 25 MEQ/L (ref 21–32)
CREAT SERPL-MCNC: 1.4 MG/DL (ref 0.7–1.2)
CREAT SERPL-MCNC: 1.6 MG/DL (ref 0.6–1.3)
EKG ATRIAL RATE: 84 BPM
EKG P AXIS: 68 DEGREES
EKG P-R INTERVAL: 152 MS
EKG Q-T INTERVAL: 362 MS
EKG QRS DURATION: 88 MS
EKG QTC CALCULATION (BAZETT): 427 MS
EKG R AXIS: 14 DEGREES
EKG T AXIS: 84 DEGREES
EKG VENTRICULAR RATE: 84 BPM
GFR SERPL CREATININE-BSD FRML MDRD: 49 ML/MIN/1.73M2
GFR SERPL CREATININE-BSD FRML MDRD: 58 ML/MIN/1.73M2
GLUCOSE SERPL-MCNC: 102 MG/DL (ref 74–106)
GLUCOSE SERPL-MCNC: 109 MG/DL (ref 74–109)
MAGNESIUM SERPL-MCNC: 1.5 MG/DL (ref 1.8–2.4)
POTASSIUM SERPL-SCNC: 5 MEQ/L (ref 3.5–5.1)
POTASSIUM SERPL-SCNC: 6.1 MEQ/L (ref 3.5–5.2)
PROT SERPL-MCNC: 7.8 GM/DL (ref 6.4–8.2)
SODIUM SERPL-SCNC: 136 MEQ/L (ref 136–145)
SODIUM SERPL-SCNC: 137 MEQ/L (ref 135–145)

## 2025-06-10 PROCEDURE — 96375 TX/PRO/DX INJ NEW DRUG ADDON: CPT

## 2025-06-10 PROCEDURE — 80053 COMPREHEN METABOLIC PANEL: CPT

## 2025-06-10 PROCEDURE — 93005 ELECTROCARDIOGRAM TRACING: CPT | Performed by: EMERGENCY MEDICINE

## 2025-06-10 PROCEDURE — 83735 ASSAY OF MAGNESIUM: CPT

## 2025-06-10 PROCEDURE — 96374 THER/PROPH/DIAG INJ IV PUSH: CPT

## 2025-06-10 PROCEDURE — 36415 COLL VENOUS BLD VENIPUNCTURE: CPT

## 2025-06-10 PROCEDURE — 2500000003 HC RX 250 WO HCPCS: Performed by: EMERGENCY MEDICINE

## 2025-06-10 PROCEDURE — 93010 ELECTROCARDIOGRAM REPORT: CPT | Performed by: INTERNAL MEDICINE

## 2025-06-10 PROCEDURE — 99284 EMERGENCY DEPT VISIT MOD MDM: CPT

## 2025-06-10 RX ORDER — CALCIUM CHLORIDE 100 MG/ML
1000 INJECTION INTRAVENOUS; INTRAVENTRICULAR ONCE
Status: COMPLETED | OUTPATIENT
Start: 2025-06-10 | End: 2025-06-10

## 2025-06-10 RX ORDER — INDOMETHACIN 25 MG/1
50 CAPSULE ORAL ONCE
Status: COMPLETED | OUTPATIENT
Start: 2025-06-10 | End: 2025-06-10

## 2025-06-10 RX ADMIN — CALCIUM CHLORIDE 1000 MG: 100 INJECTION INTRAVENOUS; INTRAVENTRICULAR at 16:16

## 2025-06-10 RX ADMIN — SODIUM BICARBONATE 50 MEQ: 84 INJECTION INTRAVENOUS at 16:10

## 2025-06-10 ASSESSMENT — PAIN - FUNCTIONAL ASSESSMENT: PAIN_FUNCTIONAL_ASSESSMENT: NONE - DENIES PAIN

## 2025-06-10 NOTE — TELEPHONE ENCOUNTER
[facility] lab called with a critical K of 6.1  They ran the labs twice because patient didn't have a history of this.  They got same result. Cash served Dr. De La Cruz and he advised patient to go to ED.    I called and spoke with dougs wife.  They are going to the ED.

## 2025-06-10 NOTE — ED PROVIDER NOTES
[facility] Emergency Department  [address]  [facility] [address]  Phone: [phone]  EMERGENCY DEPARTMENT ENCOUNTER      Pt Name: Grupo Gusman  MRN: [id number]  Birthdate 1965  Date of evaluation: 6/10/2025  Provider: Mark Leon MD    CHIEF COMPLAINT       Chief Complaint   Patient presents with    high potassium level         HISTORY OF PRESENT ILLNESS      Grupo Gusman is a 59 y.o. male who presents to the emergency department with above noted complaint.  Patient has been doing fine.  He has no complaints he had some outpatient blood draw today and his potassium was elevated.  He is on potassium supplements to prevent kidney stones.  They spoke to his nephrologist who recommended coming to the hospital.        REVIEW OF SYSTEMS     Positive for elevated potassium no symptoms  Review of Systems  All systems negative except as marked.     PAST MEDICAL HISTORY     Past Medical History:   Diagnosis Date    Hypertension          SURGICAL HISTORY       Past Surgical History:   Procedure Laterality Date    TONSILLECTOMY      during childhood         CURRENT MEDICATIONS       Current Discharge Medication List        CONTINUE these medications which have NOT CHANGED    Details   pregabalin (LYRICA) 75 MG capsule Take 1 capsule by mouth 3 times daily for 90 days. Max Daily Amount: 225 mg  Qty: 90 capsule, Refills: 2    Associated Diagnoses: Lumbar radiculitis; Neuropathy; Lumbar spondylosis; SI (sacroiliac) joint inflammation      potassium citrate (UROCIT-K) 10 MEQ (1080 MG) extended release tablet Take 1 tablet by mouth 4 times daily  Qty: 270 tablet, Refills: 5      losartan (COZAAR) 100 MG tablet Take 1 tablet by mouth daily  Qty: 90 tablet, Refills: 3    Associated Diagnoses: Primary hypertension      metFORMIN (GLUCOPHAGE-XR) 500 MG extended release tablet Take 1 tablet by mouth daily (with breakfast)  Qty: 90 tablet, Refills: 3    Associated Diagnoses: Type 2 diabetes  Medication List          (Please note that portions of this note were completed with a voice recognition program.  Efforts were made to edit the dictations but occasionally words are mis-transcribed.)    Mark Leon MD (electronically signed)  Attending Emergency Physician                       Mark Leon MD  06/10/25 [id number]

## 2025-06-10 NOTE — ED TRIAGE NOTES
Pt reports called by physician today stating he has a high potassium level and needs to be evaluated. Pt denies any palpitations, pain, or shortness of breath.

## 2025-06-10 NOTE — DISCHARGE INSTR - COC
Continuity of Care Form    Patient Name: Grupo Gusman   :  1965  MRN:  [id number]    Admit date:  6/10/2025  Discharge date:  ***    Code Status Order: No Order   Advance Directives:     Admitting Physician:  No admitting provider for patient encounter.  PCP: Nona Sunshine, APRN - CNP    Discharging Nurse: ***  Discharging Hospital Unit/Room#: E7/E7  Discharging Unit Phone Number: ***    Emergency Contact:   Extended Emergency Contact Information  Primary Emergency Contact: Patti Gusman  Address: [address] [place] [address]  Home Phone: [phone]  Mobile Phone: [phone]  Relation: Spouse  Secondary Emergency Contact: Guillermina Guevara  Home Phone: [phone]  Mobile Phone: [phone]  Relation: Child    Past Surgical History:  Past Surgical History:   Procedure Laterality Date    TONSILLECTOMY      during childhood       Immunization History:   Immunization History   Administered Date(s) Administered    COVID-19, J&J, (age 18y+), IM, 0.5 mL 03/10/2021, 2021    COVID-19, MODERNA Bivalent, (age 12y+), IM, 50 mcg/0.5 mL 02/10/2023    Influenza Virus Vaccine 10/21/2013, 10/14/2014, 2015, 2016, 2020, 2021, 2022    Influenza Whole 10/15/2008    Influenza, FLUARIX, FLULAVAL, FLUZONE (age 6 mo+) and AFLURIA, (age 3 y+), Quadv PF, 0.5mL 2016, 2020, 2021, 2022    Influenza, FLUCELVAX, (age 6 mo+), MDCK, Quadv MDV, 0.5mL 2018    Influenza, FLUCELVAX, (age 6 mo+), MDCK, Quadv PF, 0.5mL 10/17/2019    Pneumococcal, PCV20, PREVNAR 20, (age 6w+), IM, 0.5mL 2024    TDaP, ADACEL (age 10y-64y), BOOSTRIX (age 10y+), IM, 0.5mL 10/12/2010, 2020    Zoster Live (Zostavax) 2017    Zoster Recombinant (Shingrix) 2022, 2022       Active Problems:  Patient Active Problem List   Diagnosis Code    Primary hypertension I10    Hematuria R31.9    History of kidney stones Z87.442

## 2025-06-11 ENCOUNTER — RESULTS FOLLOW-UP (OUTPATIENT)
Dept: NEPHROLOGY | Age: 60
End: 2025-06-11

## 2025-06-11 NOTE — TELEPHONE ENCOUNTER
Left a message on [employer] voicemail informing of Dr. De La Cruz's orders regarding potassium. Asked that he call back to confirm. MAR updated.

## 2025-06-18 ENCOUNTER — OFFICE VISIT (OUTPATIENT)
Dept: NEPHROLOGY | Age: 60
End: 2025-06-18
Payer: COMMERCIAL

## 2025-06-18 VITALS
BODY MASS INDEX: 33.21 KG/M2 | DIASTOLIC BLOOD PRESSURE: 82 MMHG | HEART RATE: 78 BPM | WEIGHT: 232 LBS | OXYGEN SATURATION: 98 % | HEIGHT: 70 IN | SYSTOLIC BLOOD PRESSURE: 144 MMHG

## 2025-06-18 DIAGNOSIS — E87.5 HYPERKALEMIA: ICD-10-CM

## 2025-06-18 DIAGNOSIS — E83.42 HYPOMAGNESEMIA: ICD-10-CM

## 2025-06-18 DIAGNOSIS — E11.21 DIABETIC NEPHROPATHY ASSOCIATED WITH TYPE 2 DIABETES MELLITUS (HCC): ICD-10-CM

## 2025-06-18 DIAGNOSIS — N20.0 NEPHROLITHIASIS: ICD-10-CM

## 2025-06-18 DIAGNOSIS — N18.31 STAGE 3A CHRONIC KIDNEY DISEASE (HCC): Primary | ICD-10-CM

## 2025-06-18 DIAGNOSIS — I10 PRIMARY HYPERTENSION: ICD-10-CM

## 2025-06-18 PROCEDURE — 3077F SYST BP >= 140 MM HG: CPT | Performed by: INTERNAL MEDICINE

## 2025-06-18 PROCEDURE — 3079F DIAST BP 80-89 MM HG: CPT | Performed by: INTERNAL MEDICINE

## 2025-06-18 PROCEDURE — 99214 OFFICE O/P EST MOD 30 MIN: CPT | Performed by: INTERNAL MEDICINE

## 2025-06-18 NOTE — PROGRESS NOTES
We sent José Luis to ED 6/10/25 for critical potassium of 6. He says that he took 2 potassium 10 meq's that morning before his blood draw. He was instructed to hold the potassium supplement until he sees Dr. De La Cruz. He has held it since. He says the potassium was increased last year by Dr. De La Cruz to prevent stones. He was ordered to take it 1 tab 4 times daily but was taking 2 tabs twice daily.  No stone issues in past year. BP approximately 130's/  low 90's. At home

## 2025-06-18 NOTE — PROGRESS NOTES
Good ocular health on dilated exam today despite abnormal findings. Patient instructed to call office immediately if sudden changes in vision occur. Emphasized importance of sunglasses and healthy lifestyle. Renal Progress Note    Assessment and Plan:      Diagnosis Orders   1. Stage 3a chronic kidney disease (HCC)        2. Hyperkalemia        3. Diabetic nephropathy associated with type 2 diabetes mellitus (HCC)        4. Primary hypertension        5. Hypomagnesemia        6. Nephrolithiasis                  PLAN:  Serum creatinine is increased to 1.6 mg/dL from 1.1 mg/dL in June 2024.  It was 1.4 mg/dL about a week ago however.  Hyperkalemia is resolved with a serum potassium level coming down to 5.0 mEq/L from 6.1 mEq/L.  We provided him with a low potassium diet however.  Diabetes mellitus is managed by another provider.  Hypertension is stable and is also managed by another provider.  He is going to have a magnesium level checked again by the primary care provider according to him.  Medications reviewed  Continue potassium citrate for nephrolithiasis take 2 tablets once a day which is a decrease from 4 tablets once a day.  The decrease is due to hyperkalemia and absence of stone attack for the last 5 years.  Hospital records reviewed  No changes  Return visit in 12 months with labs          Patient Active Problem List   Diagnosis    Primary hypertension    Hematuria    History of kidney stones    Hypertriglyceridemia           Subjective:   Chief complaint:  Chief Complaint   Patient presents with    Follow-up     1 year FU for hypertension and nephrolithiasis      HPI:This is a follow up visit for Mr. Grupo Gusman here today for return appointment.  I see him for chronic kidney disease.  He was last seen about 12 months ago.  Serum creatinine was 2.0 mg/L in July 2024.  It was 1.1 mg/dL in June 2024.  Today it is 1.6 mg/dL.  He recently was in the emergency department for hyperkalemia with a serum potassium level of 6.1 mEq/L.  He was treated and discharged in good condition.  Serum potassium now is 5.0 mEq/L.  He has a history of nephrolithiasis and has been on potassium citrate which is now on hold.  No

## 2025-07-02 ENCOUNTER — OFFICE VISIT (OUTPATIENT)
Dept: PHYSICAL MEDICINE AND REHAB | Age: 60
End: 2025-07-02
Payer: COMMERCIAL

## 2025-07-02 VITALS
WEIGHT: 232 LBS | HEIGHT: 70 IN | DIASTOLIC BLOOD PRESSURE: 86 MMHG | BODY MASS INDEX: 33.21 KG/M2 | SYSTOLIC BLOOD PRESSURE: 132 MMHG

## 2025-07-02 DIAGNOSIS — G62.9 NEUROPATHY: ICD-10-CM

## 2025-07-02 DIAGNOSIS — G89.4 CHRONIC PAIN SYNDROME: ICD-10-CM

## 2025-07-02 DIAGNOSIS — M47.816 LUMBAR SPONDYLOSIS: ICD-10-CM

## 2025-07-02 DIAGNOSIS — M47.816 LUMBAR FACET ARTHROPATHY: ICD-10-CM

## 2025-07-02 DIAGNOSIS — M54.16 LUMBAR RADICULITIS: Primary | ICD-10-CM

## 2025-07-02 DIAGNOSIS — M46.1 SI (SACROILIAC) JOINT INFLAMMATION: ICD-10-CM

## 2025-07-02 PROCEDURE — 3079F DIAST BP 80-89 MM HG: CPT | Performed by: NURSE PRACTITIONER

## 2025-07-02 PROCEDURE — 3075F SYST BP GE 130 - 139MM HG: CPT | Performed by: NURSE PRACTITIONER

## 2025-07-02 PROCEDURE — 99214 OFFICE O/P EST MOD 30 MIN: CPT | Performed by: NURSE PRACTITIONER

## 2025-07-02 RX ORDER — PREGABALIN 75 MG/1
75 CAPSULE ORAL 3 TIMES DAILY
Qty: 90 CAPSULE | Refills: 2 | Status: SHIPPED | OUTPATIENT
Start: 2025-07-02 | End: 2025-09-30

## 2025-07-02 ASSESSMENT — ENCOUNTER SYMPTOMS
BACK PAIN: 1
RESPIRATORY NEGATIVE: 1

## 2025-07-02 NOTE — PROGRESS NOTES
Functionality Assessment/Goals Worksheet     On a scale of 0 (Does not Interfere) to 10 (Completely Interferes)     1.  Which number describes how during the past week pain has interfered with           the following:  A.  General Activity:  3  B.  Mood: 3  C.  Walking Ability:  2  D.  Normal Work (Includes both work outside the home and housework):  2  E.  Relations with Other People:   2  F.  Sleep:   0  G.  Enjoyment of Life:   2    2.  Patient Prefers to Take their Pain Medications:     [x]  On a regular basis   []  Only when necessary    []  Does not take pain medications    3.  What are the Patient's Goals/Expectations for Visiting Pain Management?     []  Learn about my pain    [x]  Receive Medication   []  Physical Therapy     []  Treat Depression   []  Receive Injections    []  Treat Sleep   []  Deal with Anxiety and Stress   []  Treat Opoid Dependence/Addiction   []  Other:     HPI:     ChiefComplaint: Lumbar back pain    F/U   Was in ER for hyperkalemia.  Follows Nephrology  CKD    He has some kidney stones     Still doing well on Lyrica and uses CBD to legs.  States he has continued 50-60% pain relief or benefit,   Here with wife. He still has some mild thigh and toe pain but much better and more tolerable than before trying the Lyrica. Sleeping better. BLE pain depends on activity   Pain at highest 1-2/10 mornings then after end of day 4/10   He stretches and uses bike.  He has loss some weight.  More  leg foot toe pain after on feet all day and lifting a lot.   By end of day his feet toes thigh pain  will increase, when he rest gets his legs and feet up it resolves some.   He continues to work.. He uses CBD      Pain at highest 6-7/10  after working and  HS lowest 2/10 average 4  OSWESTRY DISABILITY SCORE=8/50 16%    HPI  New pt here for  c/o low back pain BLE thighs with toes balls of feet numbness over past 3 yrs. The pain skips lower legs just in thighs and feet.  Was in RLE  then now LLE.. He did

## 2025-07-07 ENCOUNTER — HOSPITAL ENCOUNTER (OUTPATIENT)
Age: 60
Discharge: HOME | End: 2025-07-07
Payer: COMMERCIAL

## 2025-07-07 DIAGNOSIS — E66.811 CLASS 1 OBESITY WITH SERIOUS COMORBIDITY AND BODY MASS INDEX (BMI) OF 33.0 TO 33.9 IN ADULT, UNSPECIFIED OBESITY TYPE: ICD-10-CM

## 2025-07-07 DIAGNOSIS — Z12.5 SCREENING PSA (PROSTATE SPECIFIC ANTIGEN): ICD-10-CM

## 2025-07-07 DIAGNOSIS — I10 PRIMARY HYPERTENSION: ICD-10-CM

## 2025-07-07 DIAGNOSIS — N18.31 STAGE 3A CHRONIC KIDNEY DISEASE (HCC): ICD-10-CM

## 2025-07-07 DIAGNOSIS — E78.1 HYPERTRIGLYCERIDEMIA: ICD-10-CM

## 2025-07-07 DIAGNOSIS — E11.9 TYPE 2 DIABETES MELLITUS WITHOUT COMPLICATION, WITHOUT LONG-TERM CURRENT USE OF INSULIN (HCC): ICD-10-CM

## 2025-07-07 LAB
ALBUMIN SERPL BCG-MCNC: 4.5 G/DL (ref 3.4–4.9)
ALP SERPL-CCNC: 92 U/L (ref 40–129)
ALT SERPL W/O P-5'-P-CCNC: 46 U/L (ref 10–50)
ANION GAP SERPL CALC-SCNC: 13 MEQ/L (ref 8–16)
AST SERPL-CCNC: 34 U/L (ref 10–50)
BASOPHILS ABSOLUTE: 0.1 THOU/MM3 (ref 0–0.1)
BASOPHILS NFR BLD AUTO: 1.1 %
BILIRUB CONJ SERPL-MCNC: < 0.1 MG/DL (ref 0–0.2)
BILIRUB SERPL-MCNC: 0.4 MG/DL (ref 0.3–1.2)
BUN SERPL-MCNC: 24 MG/DL (ref 8–23)
CALCIUM SERPL-MCNC: 9.4 MG/DL (ref 8.8–10.2)
CHLORIDE SERPL-SCNC: 103 MEQ/L (ref 98–111)
CHOLEST SERPL-MCNC: 205 MG/DL (ref 100–199)
CO2 SERPL-SCNC: 21 MEQ/L (ref 22–29)
CREAT SERPL-MCNC: 1.3 MG/DL (ref 0.7–1.2)
DEPRECATED RDW RBC AUTO: 44.3 FL (ref 35–45)
EOSINOPHIL NFR BLD AUTO: 2.4 %
EOSINOPHILS ABSOLUTE: 0.1 THOU/MM3 (ref 0–0.4)
ERYTHROCYTE [DISTWIDTH] IN BLOOD BY AUTOMATED COUNT: 13 % (ref 11.5–14.5)
GFR SERPL CREATININE-BSD FRML MDRD: 63 ML/MIN/1.73M2
GLUCOSE SERPL-MCNC: 104 MG/DL (ref 74–109)
HCT VFR BLD AUTO: 47.8 % (ref 42–52)
HDLC SERPL-MCNC: 35 MG/DL
HGB BLD-MCNC: 15 GM/DL (ref 14–18)
IMM GRANULOCYTES # BLD AUTO: 0.02 THOU/MM3 (ref 0–0.07)
IMM GRANULOCYTES NFR BLD AUTO: 0.3 %
LDLC SERPL CALC-MCNC: ABNORMAL MG/DL
LYMPHOCYTES ABSOLUTE: 1.6 THOU/MM3 (ref 1–4.8)
LYMPHOCYTES NFR BLD AUTO: 25.5 %
MCH RBC QN AUTO: 29.2 PG (ref 26–33)
MCHC RBC AUTO-ENTMCNC: 31.4 GM/DL (ref 32.2–35.5)
MCV RBC AUTO: 93.2 FL (ref 80–94)
MONOCYTES ABSOLUTE: 0.6 THOU/MM3 (ref 0.4–1.3)
MONOCYTES NFR BLD AUTO: 9.8 %
NEUTROPHILS ABSOLUTE: 3.8 THOU/MM3 (ref 1.8–7.7)
NEUTROPHILS NFR BLD AUTO: 60.9 %
NRBC BLD AUTO-RTO: 0 /100 WBC
PLATELET # BLD AUTO: 239 THOU/MM3 (ref 130–400)
PMV BLD AUTO: 11.5 FL (ref 9.4–12.4)
POTASSIUM SERPL-SCNC: 5.5 MEQ/L (ref 3.5–5.2)
PROT SERPL-MCNC: 7.9 G/DL (ref 6.4–8.3)
PSA SERPL-MCNC: 0.22 NG/ML (ref 0–1)
RBC # BLD AUTO: 5.13 MILL/MM3 (ref 4.7–6.1)
SODIUM SERPL-SCNC: 137 MEQ/L (ref 135–145)
TRIGL SERPL-MCNC: 431 MG/DL (ref 0–199)
WBC # BLD AUTO: 6.2 THOU/MM3 (ref 4.8–10.8)

## 2025-07-07 PROCEDURE — 36415 COLL VENOUS BLD VENIPUNCTURE: CPT

## 2025-07-07 PROCEDURE — 82248 BILIRUBIN DIRECT: CPT

## 2025-07-07 PROCEDURE — 84153 ASSAY OF PSA TOTAL: CPT

## 2025-07-07 PROCEDURE — 80061 LIPID PANEL: CPT

## 2025-07-07 PROCEDURE — 85025 COMPLETE CBC W/AUTO DIFF WBC: CPT

## 2025-07-07 PROCEDURE — 80053 COMPREHEN METABOLIC PANEL: CPT
